# Patient Record
Sex: MALE | Race: WHITE | NOT HISPANIC OR LATINO | Employment: OTHER | URBAN - NONMETROPOLITAN AREA
[De-identification: names, ages, dates, MRNs, and addresses within clinical notes are randomized per-mention and may not be internally consistent; named-entity substitution may affect disease eponyms.]

---

## 2024-10-11 ENCOUNTER — OFFICE VISIT (OUTPATIENT)
Dept: SURGERY | Facility: CLINIC | Age: 74
End: 2024-10-11
Payer: MEDICARE

## 2024-10-11 VITALS
HEIGHT: 66 IN | HEART RATE: 74 BPM | SYSTOLIC BLOOD PRESSURE: 162 MMHG | OXYGEN SATURATION: 94 % | BODY MASS INDEX: 27.23 KG/M2 | DIASTOLIC BLOOD PRESSURE: 101 MMHG | WEIGHT: 169.4 LBS

## 2024-10-11 DIAGNOSIS — K64.4 EXTERNAL HEMORRHOID: ICD-10-CM

## 2024-10-11 DIAGNOSIS — K64.1 GRADE II HEMORRHOIDS: ICD-10-CM

## 2024-10-11 RX ORDER — GLIPIZIDE 5 MG/1
TABLET ORAL
COMMUNITY
Start: 2024-09-26

## 2024-10-11 RX ORDER — ALLOPURINOL 300 MG/1
1 TABLET ORAL DAILY
COMMUNITY
Start: 2024-09-26

## 2024-10-11 RX ORDER — METOPROLOL SUCCINATE 50 MG/1
TABLET, EXTENDED RELEASE ORAL
COMMUNITY

## 2024-10-11 RX ORDER — OMEPRAZOLE 40 MG/1
1 CAPSULE, DELAYED RELEASE ORAL DAILY
COMMUNITY
Start: 2024-09-26

## 2024-10-11 RX ORDER — LISINOPRIL 20 MG/1
1 TABLET ORAL DAILY
COMMUNITY
Start: 2024-09-26

## 2024-10-11 RX ORDER — SIMVASTATIN 40 MG
1 TABLET ORAL DAILY
COMMUNITY
Start: 2024-09-26

## 2024-10-11 RX ORDER — ALBUTEROL SULFATE 90 UG/1
INHALANT RESPIRATORY (INHALATION)
COMMUNITY

## 2024-10-11 NOTE — PROGRESS NOTES
[facility] General Surgery Clinic History and Physical  Kem Joseph  MRN: [id number]  Age: 73 y.o. male   YOB: 1950      SUBJECTIVE  History of Presenting Illness   Patient is a 73 y.o. male presenting for hemorrhoid evaluation.  Describes symptoms as issues cleaning, concerns for some incontinence, with intermittent bleeding and minimal pain, and describes bowel movements as sometimes hard sometimes soft and usually spends less than 5 minutes on the commode with intermittent straining.    Colonoscopy: Does not remember date of last colonoscopy-is due in November of this year, states that he has never had concerning findings on a colonoscopy  Family hx of colorectal cancer: None  Anticoagulation/Antiplatelet meds: None    He otherwise denies lightheadedness, dizziness, fainting, passing out, headache, nausea, vomiting, chest pain, palpitations, shortness of breath, coughing, wheezing, heart burn, reflux, skin lesions, unintended weight loss/gain, sensitivity to heat/cold, changes in sensation, changes in vision/hearing/smell/taste, myalgias, arthralgias, and has no other acute complaints or concerning symptoms to report at this time.      Past Medical History     Past Medical History:  Past Medical History:   Diagnosis Date    Hypertension        PCP: Paul Casas MD    Past Surgical History:  Past Surgical History:   Procedure Laterality Date    KNEE ARTHROSCOPY W/ LATERAL RELEASE / MEDIAL IMBRICATION      UMBILICAL HERNIA REPAIR         Family History:  Family History   Problem Relation Age of Onset    No Known Problems Mother        Social History:  Social History     Tobacco Use    Smoking status: Never     Passive exposure: Never    Smokeless tobacco: Never   Substance Use Topics    Alcohol use: Not Currently       Allergies:  No Known Allergies    Medications:  (Not in a hospital admission)        Review of Systems   Per HPI.      Physical Examination     Vitals:    10/11/24  "1038   BP: (!) 162/101   Pulse: 74   SpO2: 94%   Weight: 76.8 kg (169 lb 6.4 oz)   Height: 167.6 cm (66\")       Estimated body mass index is 27.34 kg/m² as calculated from the following:    Height as of this encounter: 167.6 cm (66\").    Weight as of this encounter: 76.8 kg (169 lb 6.4 oz).  PREVIOUS WEIGHTS:   Wt Readings from Last 5 Encounters:   10/11/24 76.8 kg (169 lb 6.4 oz)       Physical Examination:  Gen: awake, alert, sitting up in chair in no acute distress  HEENT: NCAT, EOMI, anicteric sclerae  CV: RRR, normotensive  Resp: nonlabored on room air, sats appropriate  Abd: soft, nontender, nondistended without palpable mass; well-healed umbilical hernia repair scars  Rectal:   External: Moderately enlarged right external hemorrhoid, small left-sided external hemorrhoid; both are nontender without sequela of bleeding, neither has evidence of thrombosis   ELTON: Large hemorrhoid columns, no stool or blood in the rectal vault   *Please refer to anoscopy report for further details.*  MSK: moves all four, no c/c/e  Neuro: no focal deficits, cranial nerves grossly intact  Psy:  appropriate, cooperative      Data Review   No labs or imaging to review at this time.      Problem List   There is no problem list on file for this patient.           Assessment/Plan   Kem Joseph is a 73 y.o. male with both internal and external hemorrhoids.  We discussed that the internal hemorrhoids are likely causing his bleeding, while the large right sided external hemorrhoid may be causing issues with his ability to keep clean.  I discussed the plan to treat his internal hemorrhoidal disease with rubber band ligation in hopes that this may decrease the vascular burden to the external hemorrhoids and, along with conservative measures, assist and a reduction in size.  Once we are finished with those treatments, we will check his progress, and see if he would like to proceed with excisional hemorrhoidectomy for his external hemorrhoids " at that time.    We discussed the nature of hemorrhoids as physiologic rather than pathologic, the role of conservative management, and options for intervention including rubber band ligation and surgical hemorrhoidectomy.  We discussed that hemorrhoidectomy is reserved for refractory external hemorrhoids and internal hemorrhoids that do not respond to conservative management or banding. I have recommended treatment of internal hemorrhoids and have treated with rubber band ligation of the left lateral column.  He was again counseled as to appropriate fiber and water intake, appropriate toileting habits, and he was provided information packet with this information prior to his discharge from clinic.  He was counseled to call our office or return to the emergency department with any significant bleeding per rectum, or any problems following examination/treatment today.  Ample time was provided for questions all of which were answered to the patient's apparent satisfaction.     Smoking cessation counseling: Never-not indicated  BMI counselin.3-not indicated  Med rec complete: Yes  VTE: Not indicated    Time Spent: I spent 30 minutes caring for Kem Joseph on this date of service. This time includes time, independent of procedures, spent by me in the following activities: preparing for the clinic visit, reviewing tests, obtaining and/or reviewing a separately obtained history, performing a medically appropriate examination and/or evaluation, counseling and educating the patient/family/caregiver, ordering medications, tests, or procedures, and documenting information in the medical record.     Kt Erazo MD  10/11/2024 11:22 CDT

## 2024-10-11 NOTE — PATIENT INSTRUCTIONS
General Surgery Clinic Discharge Instructions      Kem Joseph  10/11/24      General Surgery Clinic Discharge Instructions: RUBBER BAND LIGATION OF HEMORRHOIDS    This method involves the application of small rubber bands to the hemorrhoids. The bands, being elastic, tighten down and destroy the hemorrhoids trapped inside the band, just as if they were cut out with a scalpel.  Only internal hemorrhoids are treated this way. These hemorrhoids do not have “pain” fibers. For this reason, we do not have to use any anesthetic. You will feel and hear a snap or thump as the bands are applied. About 25% of patients experience mild, dull anal discomfort lasting for 2-3 days following the procedure. You may feel a dull ache, not severe pain, for a few hours. Many like to describe the feeling of discomfort thus: “I feel as if I want to have a bowel movement” or “there seems to be stool in my rectum near the opening”. This feeling will occur as soon as the rubber bands are applied. Sitting in a hot tub will usually relieve this sensation.  In 24-48 hours, the hemorrhoid caught in the rubber band will be completely destroyed. The destroyed hemorrhoid will drop off in approximately 7-14 days. You will usually not notice this, although some patients may notice spotting at that time. By avoiding constipation, straining or loose stools, you should experience no trouble with marked bleeding.      AFTER TREATMENT  Activity  You may go ahead with your usual activity.  You may drive your car immediately after the treatment.  AVOID heavy lifting, if possible (for the first 24 hours after your procedure.)    Diet  You can eat what you like, but follow fiber recommendations per the information sheet we provided to you today  Drink a MINIMUM of 10 glasses of fluid daily.    Medications  Take your medications as prescribed.  If you usually take Aspirin 81mg or 325mg per day you may continue to take this,  otherwise NO aspirin for 14  days.  TYLENOL (not aspirin) if needed, may be taken for pain.  Take warm sitz baths 3-4 times daily, especially after each bowel movement, 15 minutes at a time. The water temperature should be as hot as you can tolerate. Check the water temperature with your elbow. You will find the sitz bath soothing and relaxing.    Bowel Movements  DO NOT STRAIN to have a bowel movement.  DO NOT SIT on the toilet for more than 5 minutes.  NEVER read while sitting on the toilet! Take the Storee OUT of the bathroom    Problems  Some spotting or bleeding is to be expected even up to 14 days after the procedure.  Report to the ER immediately if there is profuse bleeding.  About 2% of the patients treated with Rubber Band Ligation develop thrombosis of an external hemorrhoid which may require excision.    Hemorrhoids may reoccur. The best preventive measures are proper diet and toilet habits.    REMEMBER: RECTAL BLEEDING, WHICH MAY APPEAR IN THE FUTURE, SHOULD NOT BE DISREGARDED. OTHER CAUSES OF BLEEDING SUCH AS FISSURES, POLYPS OR CANCER CAN APPEAR AND SHOULD BE INVESTIGATED.    Diagnosis: internal and external hemorrhoids    Medications/Treatments:   (Take all medications as prescribed by your provider)  Fiber supplementation - like metamucil - daily  Continue appropriate toileting habits  Daily or twice-daily sitz baths      Follow-up: Return to clinic in 4 weeks for further hemorrhoid treatment.       Please call our office at [phone] with any issues, questions, or concerns.        Kt Erazo MD  10/11/24

## 2024-10-11 NOTE — LETTER
October 11, 2024       No Recipients    Patient: Kem Joseph   YOB: 1950   Date of Visit: 10/11/2024     Dear Paul Casas MD:       I have seen eKm Joseph in my clinic today in evaluation of his anorectal complaints.  On my exam, he has both symptomatic internal and external hemorrhoids.  I discussed with him that we will attempt to treat his internal hemorrhoidal disease first, in an attempt to decrease the vascular burden on the external hemorrhoids; in addition we have talked about lifestyle modifications, including increased fiber and water intake, appropriate toileting habits, and daily sitz bath, in an effort to improve symptoms conservatively as well.  I will see him back over the next couple months to continue treating his internal hemorrhoidal disease, after which we will assess the progress on his external disease, and make a decision about excisional hemorrhoidectomy at that time.    If you have any questions or concerns about his care, please feel free to reach out.  Thank you very much for this referral, and for involving me in Kem's care.         Sincerely,        Kt Erazo MD        CC:   No Recipients    Kt Erazo MD  10/11/24 1118  Sign when Signing Visit    General Surgery Rubber Band Ligation and Anoscopy Procedure Note    Indication: Bleeding internal hemorrhoids    Procedure: The patient was placed in the left lateral decubitus position.  The anoscope was gently inserted and the bowel was inspected.  Visualization was good.  Mucosa was generally pink and healthy appearing.  Anoscopy revealed enlarged columns at all 3 locations, with the left lateral column being the largest, with some sequela of recent bleeding. Decision was made to place a rubber band on the LL hemorrhoid column, which appeared to be the most enlarged and inflamed, with some sequelae of recent bleeding. The pedicle of that hemorrhoid was grasped and retracted into the McGiveny  ligator, and two bands were applied in the usual fashion. The patient tolerated this well, without significant discomfort or significant bleeding.     Information sheets, follow-up appointments, and return precautions were given to the patient prior to discharge from clinic.       Kt Erazo MD  10/11/24      Kt Erazo MD  10/11/24 1122  Sign when Signing Visit    [facility] Surgery Clinic History and Physical  Kem Joseph  MRN: [id number]  Age: 73 y.o. male   YOB: 1950      SUBJECTIVE  History of Presenting Illness   Patient is a 73 y.o. male presenting for hemorrhoid evaluation.  Describes symptoms as issues cleaning, concerns for some incontinence, with intermittent bleeding and minimal pain, and describes bowel movements as sometimes hard sometimes soft and usually spends less than 5 minutes on the commode with intermittent straining.    Colonoscopy: Does not remember date of last colonoscopy-is due in November of this year, states that he has never had concerning findings on a colonoscopy  Family hx of colorectal cancer: None  Anticoagulation/Antiplatelet meds: None    He otherwise denies lightheadedness, dizziness, fainting, passing out, headache, nausea, vomiting, chest pain, palpitations, shortness of breath, coughing, wheezing, heart burn, reflux, skin lesions, unintended weight loss/gain, sensitivity to heat/cold, changes in sensation, changes in vision/hearing/smell/taste, myalgias, arthralgias, and has no other acute complaints or concerning symptoms to report at this time.      Past Medical History     Past Medical History:  Past Medical History:   Diagnosis Date   • Hypertension        PCP: Paul Casas MD    Past Surgical History:  Past Surgical History:   Procedure Laterality Date   • KNEE ARTHROSCOPY W/ LATERAL RELEASE / MEDIAL IMBRICATION     • UMBILICAL HERNIA REPAIR         Family History:  Family History   Problem Relation Age of Onset  "  • No Known Problems Mother        Social History:  Social History     Tobacco Use   • Smoking status: Never     Passive exposure: Never   • Smokeless tobacco: Never   Substance Use Topics   • Alcohol use: Not Currently       Allergies:  No Known Allergies    Medications:  (Not in a hospital admission)        Review of Systems   Per HPI.      Physical Examination     Vitals:    10/11/24 1038   BP: (!) 162/101   Pulse: 74   SpO2: 94%   Weight: 76.8 kg (169 lb 6.4 oz)   Height: 167.6 cm (66\")       Estimated body mass index is 27.34 kg/m² as calculated from the following:    Height as of this encounter: 167.6 cm (66\").    Weight as of this encounter: 76.8 kg (169 lb 6.4 oz).  PREVIOUS WEIGHTS:   Wt Readings from Last 5 Encounters:   10/11/24 76.8 kg (169 lb 6.4 oz)       Physical Examination:  Gen: awake, alert, sitting up in chair in no acute distress  HEENT: NCAT, EOMI, anicteric sclerae  CV: RRR, normotensive  Resp: nonlabored on room air, sats appropriate  Abd: soft, nontender, nondistended without palpable mass; well-healed umbilical hernia repair scars  Rectal:   External: Moderately enlarged right external hemorrhoid, small left-sided external hemorrhoid; both are nontender without sequela of bleeding, neither has evidence of thrombosis   ELTON: Large hemorrhoid columns, no stool or blood in the rectal vault   *Please refer to anoscopy report for further details.*  MSK: moves all four, no c/c/e  Neuro: no focal deficits, cranial nerves grossly intact  Psy:  appropriate, cooperative      Data Review   No labs or imaging to review at this time.      Problem List   There is no problem list on file for this patient.           Assessment/Plan   Kem Joseph is a 73 y.o. male with both internal and external hemorrhoids.  We discussed that the internal hemorrhoids are likely causing his bleeding, while the large right sided external hemorrhoid may be causing issues with his ability to keep clean.  I discussed the plan " to treat his internal hemorrhoidal disease with rubber band ligation in hopes that this may decrease the vascular burden to the external hemorrhoids and, along with conservative measures, assist and a reduction in size.  Once we are finished with those treatments, we will check his progress, and see if he would like to proceed with excisional hemorrhoidectomy for his external hemorrhoids at that time.    We discussed the nature of hemorrhoids as physiologic rather than pathologic, the role of conservative management, and options for intervention including rubber band ligation and surgical hemorrhoidectomy.  We discussed that hemorrhoidectomy is reserved for refractory external hemorrhoids and internal hemorrhoids that do not respond to conservative management or banding. I have recommended treatment of internal hemorrhoids and have treated with rubber band ligation of the left lateral column.  He was again counseled as to appropriate fiber and water intake, appropriate toileting habits, and he was provided information packet with this information prior to his discharge from clinic.  He was counseled to call our office or return to the emergency department with any significant bleeding per rectum, or any problems following examination/treatment today.  Ample time was provided for questions all of which were answered to the patient's apparent satisfaction.     Smoking cessation counseling: Never-not indicated  BMI counselin.3-not indicated  Med rec complete: Yes  VTE: Not indicated    Time Spent: I spent 30 minutes caring for Kem Joseph on this date of service. This time includes time, independent of procedures, spent by me in the following activities: preparing for the clinic visit, reviewing tests, obtaining and/or reviewing a separately obtained history, performing a medically appropriate examination and/or evaluation, counseling and educating the patient/family/caregiver, ordering medications, tests, or  procedures, and documenting information in the medical record.     Kt Erazo MD  10/11/2024 11:22 CDT

## 2024-10-11 NOTE — PROGRESS NOTES
General Surgery Rubber Band Ligation and Anoscopy Procedure Note    Indication: Bleeding internal hemorrhoids    Procedure: The patient was placed in the left lateral decubitus position.  The anoscope was gently inserted and the bowel was inspected.  Visualization was good.  Mucosa was generally pink and healthy appearing.  Anoscopy revealed enlarged columns at all 3 locations, with the left lateral column being the largest, with some sequela of recent bleeding. Decision was made to place a rubber band on the LL hemorrhoid column, which appeared to be the most enlarged and inflamed, with some sequelae of recent bleeding. The pedicle of that hemorrhoid was grasped and retracted into the McGiveny ligator, and two bands were applied in the usual fashion. The patient tolerated this well, without significant discomfort or significant bleeding.     Information sheets, follow-up appointments, and return precautions were given to the patient prior to discharge from clinic.       Kt Erazo MD  10/11/24

## 2024-10-31 ENCOUNTER — OFFICE VISIT (OUTPATIENT)
Dept: PULMONOLOGY | Facility: CLINIC | Age: 74
End: 2024-10-31
Payer: MEDICARE

## 2024-10-31 VITALS
SYSTOLIC BLOOD PRESSURE: 128 MMHG | DIASTOLIC BLOOD PRESSURE: 82 MMHG | BODY MASS INDEX: 27.9 KG/M2 | HEIGHT: 66 IN | OXYGEN SATURATION: 96 % | WEIGHT: 173.6 LBS | HEART RATE: 82 BPM

## 2024-10-31 DIAGNOSIS — J45.30 MILD PERSISTENT ASTHMA, UNSPECIFIED WHETHER COMPLICATED: Primary | ICD-10-CM

## 2024-10-31 DIAGNOSIS — Z23 NEED FOR PNEUMOCOCCAL 20-VALENT CONJUGATE VACCINATION: ICD-10-CM

## 2024-10-31 DIAGNOSIS — Z87.891 FORMER SMOKER: ICD-10-CM

## 2024-10-31 DIAGNOSIS — R06.09 CHRONIC DYSPNEA: ICD-10-CM

## 2024-10-31 RX ORDER — DEXTROMETHORPHAN HYDROBROMIDE AND PROMETHAZINE HYDROCHLORIDE 15; 6.25 MG/5ML; MG/5ML
SYRUP ORAL
COMMUNITY

## 2024-10-31 RX ORDER — AMOXICILLIN 500 MG/1
CAPSULE ORAL
COMMUNITY

## 2024-10-31 RX ORDER — DOXYCYCLINE 100 MG/1
CAPSULE ORAL
COMMUNITY

## 2024-10-31 RX ORDER — PREDNISONE 20 MG/1
TABLET ORAL
COMMUNITY

## 2024-10-31 RX ORDER — MECLIZINE HCL 12.5 MG 12.5 MG/1
2 TABLET ORAL 3 TIMES DAILY
COMMUNITY

## 2024-10-31 RX ORDER — LIDOCAINE 50 MG/G
PATCH TOPICAL
COMMUNITY

## 2024-10-31 RX ORDER — FLUTICASONE PROPIONATE 50 MCG
SPRAY, SUSPENSION (ML) NASAL
COMMUNITY

## 2024-10-31 RX ORDER — HYDROCODONE BITARTRATE AND HOMATROPINE METHYLBROMIDE ORAL SOLUTION 5; 1.5 MG/5ML; MG/5ML
LIQUID ORAL
COMMUNITY

## 2024-10-31 RX ORDER — CEFDINIR 300 MG/1
CAPSULE ORAL
COMMUNITY

## 2024-10-31 RX ORDER — ONDANSETRON 4 MG/1
TABLET, FILM COATED ORAL
COMMUNITY

## 2024-10-31 RX ORDER — HYDRALAZINE HYDROCHLORIDE 50 MG/1
1 TABLET, FILM COATED ORAL 2 TIMES DAILY
COMMUNITY

## 2024-10-31 RX ORDER — OXYCODONE AND ACETAMINOPHEN 7.5; 325 MG/1; MG/1
TABLET ORAL
COMMUNITY

## 2024-10-31 RX ORDER — FLUTICASONE PROPIONATE AND SALMETEROL 250; 50 UG/1; UG/1
POWDER RESPIRATORY (INHALATION)
COMMUNITY
Start: 2024-08-25

## 2024-10-31 RX ORDER — MONTELUKAST SODIUM 10 MG/1
TABLET ORAL
COMMUNITY
Start: 2024-09-26

## 2024-10-31 NOTE — LETTER
2024     Paul Casas MD   [facility] [address]    Patient: Kem Joseph   YOB: 1950   Date of Visit: 10/31/2024     Dear Dr. Augusto MD:    Thank you for referring Kem Joseph to me for evaluation. Below are the relevant portions of my assessment and plan of care.    If you have questions, please do not hesitate to call me. I look forward to following Kem along with you.         Sincerely,        Aleksander Norwood,         CC: No Recipients      Progress Notes:    Clinic Note - New Patient            NAME: Kem Joseph  MRN: [id number]  AGE: 73 y.o.            : 1950  PRIMARY CARE PROVIDER: Paul Casas MD               Reason for Visit:  Kem Joseph presents to clinic today as a new patient for the evaluation of asthma.    History of Present Illness:  Mr. Joseph is a 73-year-old male who presents to clinic today as a new patient.  Past medical history includes mild persistent asthma, former tobacco use, hypertension.    Patient recently moved back to the area from [place].  He was following with pulmonology in [place].  He has a history of asthma diagnosed around 2010.  During that time he was admitted to [facility] for acute hypoxic respiratory failure and pneumonia.  He is currently managed with Wixela and as needed albuterol.  He was recently started on Singulair by his pulmonologist in [place] in March of this year.  He states that his respiratory status is greatly improved with the Singulair.  He currently has no limitations in his activities due to dyspnea.  He is also noted improvement in his cough since starting the Singulair.  Denies any wheezing.    He has a history of asthma managed as above.  Has never required supplemental oxygen at home.  He is a former smoker who quit in , 15-pack-year history.  Denies any personal or family history of lung cancer or VTE.  He lives at home which is clean and dry.  No pets.   No seasonal allergies.    Review of Systems:  A full 12-point review of systems was performed and was negative except as documented in the HPI.               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  Pets: None  Recent travel: Recently moved back to the area from [place]  Previous exposure to persons with tuberculosis: None known            Physical Exam:  General: No acute distress, cooperative.  Head: Atraumatic, normocephalic, normal inspection.  Eyes: EOMI, normal inspection.  ENT: Mucous membranes moist, normal inspection. No thrush.  Heart: RRR, no murmur  Lungs: Clear to auscultation bilaterally, no wheezing  MSK: No edema or clubbing  GI/abdominal: Soft, nontender.  Neurologic: Alert, oriented.  Cranial nerves II through XII grossly intact.      Imaging Review:  None available for review.      Pulmonary Functions Testing Results:  None available for review.            Problem List:  Problem List Items Addressed This Visit          Pulmonary and Pneumonias    Mild persistent asthma - Primary    Relevant Medications    Wixela Inhub 250-50 MCG/ACT DISKUS    montelukast (SINGULAIR) 10 MG tablet    Other Relevant Orders    Complete PFT - Pre & Post Bronchodilator       Symptoms and Signs    Chronic dyspnea       Tobacco    Former smoker       Other    Need for pneumococcal 20-valent conjugate vaccination    Relevant Orders    Pneumococcal Conjugate Vaccine 20-Valent All (Completed)         Pulmonary Assessment:  Patient is a 73-year-old male with currently well-controlled asthma presenting for establish care.  Managed with ICS/LABA and as needed albuterol.  He is also on Singulair.  Will obtain pre and post PFTs as a baseline prior to follow-up.  Pneumonia vaccine given today, Arexvy sent to his pharmacy.  Up-to-date on flu vaccine.      Plan:   -Continue Wixela and as  needed albuterol, currently well-controlled asthma  -Continue Singulair  -Discussed if he has difficulty with worsening of his asthma symptoms we have several options to step up his treatment  -No longer requires screening CT  -PFTs pre and post prior to follow-up  -Up-to-date on flu, received pneumonia today, prescription sent for Arexvy           Follow Up:  3 months         Thank you Paul Casas MD for this referral and allowing me to participate in this patient's care.           Past Medical History:   Past Medical History:   Diagnosis Date   • Hypertension          Past Surgical History:   Past Surgical History:   Procedure Laterality Date   • KNEE ARTHROSCOPY W/ LATERAL RELEASE / MEDIAL IMBRICATION     • UMBILICAL HERNIA REPAIR           Family History:   Family History   Problem Relation Age of Onset   • No Known Problems Mother    • Colon cancer Neg Hx    • Colon polyps Neg Hx    • Esophageal cancer Neg Hx          Medications:   Prior to Admission medications    Medication Sig Start Date End Date Taking? Authorizing Provider   albuterol sulfate  (90 Base) MCG/ACT inhaler    Yes Garo Stone MD   allopurinol (ZYLOPRIM) 300 MG tablet Take 1 tablet by mouth Daily. 9/26/24  Yes Garo Stone MD   glipizide (GLUCOTROL) 5 MG tablet Take 1 tablet twice a day by oral route, for diabetes mellitus.. 9/26/24  Yes Garo Stone MD   lisinopril (PRINIVIL,ZESTRIL) 20 MG tablet Take 1 tablet by mouth Daily. 9/26/24  Yes Garo Stone MD   metFORMIN (GLUCOPHAGE) 500 MG tablet Take 1 tablet by mouth 2 (Two) Times a Day.   Yes Garo Stone MD   metoprolol succinate XL (TOPROL-XL) 50 MG 24 hr tablet    Yes Garo Stone MD   omeprazole (priLOSEC) 40 MG capsule Take 1 capsule by mouth Daily. 9/26/24  Yes Garo Stone MD   predniSONE (DELTASONE) 20 MG tablet TAKE 1 TABLET BY MOUTH EVERY DAY FOR 7 DAYS AS DIRECTED   Yes Garo Stone MD    promethazine-dextromethorphan (PROMETHAZINE-DM) 6.25-15 MG/5ML syrup TAKE 5 ML BY MOUTH EVERY 6 HOURS FOR 10 DAYS AS NEEDED   Yes Garo Stone MD   simvastatin (ZOCOR) 40 MG tablet Take 1 tablet by mouth Daily. 9/26/24  Yes Garo Stone MD Wixela Inhub 250-50 MCG/ACT DISKUS  8/25/24  Yes Garo Stone MD   amoxicillin (AMOXIL) 500 MG capsule Take 4 pills by mouth 1 hour prior to dental procedures.  Patient not taking: Reported on 10/31/2024    Garo Stone MD   cefdinir (OMNICEF) 300 MG capsule TAKE 2 CAPSULES BY MOUTH DAILY AFTER A MEAL FOR 7 DAYS  Patient not taking: Reported on 10/31/2024    Garo Stone MD   doxycycline (VIBRAMYCIN) 100 MG capsule TAKE 1 CAPSULE BY MOUTH TWICE DAILY FOR 7 DAYS AS DIRECTED  Patient not taking: Reported on 10/31/2024    Garo Stone MD   fluticasone (FLONASE) 50 MCG/ACT nasal spray INSTIL 1 SPRAY INTO EACH NOSTRIL EVERY DAY  Patient not taking: Reported on 10/31/2024    Garo Stone MD   hydrALAZINE (APRESOLINE) 50 MG tablet Take 1 tablet by mouth 2 (Two) Times a Day.  Patient not taking: Reported on 10/31/2024    Garo Stone MD   HYDROcodone Bit-Homatrop MBr (HYCODAN) 5-1.5 MG/5ML solution TAKE 5 ML BY MOUTH EVERY 4 HOURS  Patient not taking: Reported on 10/31/2024    Garo Stone MD   lidocaine (LIDODERM) 5 % APPLY 1 PATCH TOPICALLY ONCE DAILY. MAY WEAR UP TO 12 HOURS  Patient not taking: Reported on 10/31/2024    Garo Stone MD   meclizine (ANTIVERT) 12.5 MG tablet Take 2 tablets by mouth 3 (Three) Times a Day.  Patient not taking: Reported on 10/31/2024    Garo Stone MD   montelukast (SINGULAIR) 10 MG tablet T1T PO QD  Patient not taking: Reported on 10/31/2024 9/26/24   Garo Stone MD   ondansetron (ZOFRAN) 4 MG tablet     Garo Stone MD   oxyCODONE-acetaminophen (PERCOCET) 7.5-325 MG per tablet TAKE 1 TO 2 TABLETS BY MOUTH EVERY 6 HOURS AS NEEDED FOR  "POST OPERATIVE PAIN  Patient not taking: Reported on 10/31/2024    Provider, Garo, MD         Allergies:   Patient has no known allergies.      Results Review:             Visit Vitals  /82   Pulse 82   Ht 167.6 cm (66\")   Wt 78.7 kg (173 lb 9.6 oz)   SpO2 96% Comment: RA   BMI 28.02 kg/m²                  Sam Norwood DO  Pulmonary/Critical Care  10/31/2024  10:48 CDT  "

## 2024-10-31 NOTE — PROGRESS NOTES
Clinic Note - New Patient            NAME: Kem Joseph  MRN: [id number]  AGE: 73 y.o.            : 1950  PRIMARY CARE PROVIDER: Paul Casas MD               Reason for Visit:  Kem Joseph presents to clinic today as a new patient for the evaluation of asthma.    History of Present Illness:  Mr. Joseph is a 73-year-old male who presents to clinic today as a new patient.  Past medical history includes mild persistent asthma, former tobacco use, hypertension.    Patient recently moved back to the area from [place].  He was following with pulmonology in [place].  He has a history of asthma diagnosed around 2010.  During that time he was admitted to [facility] for acute hypoxic respiratory failure and pneumonia.  He is currently managed with Wixela and as needed albuterol.  He was recently started on Singulair by his pulmonologist in [place] in March of this year.  He states that his respiratory status is greatly improved with the Singulair.  He currently has no limitations in his activities due to dyspnea.  He is also noted improvement in his cough since starting the Singulair.  Denies any wheezing.    He has a history of asthma managed as above.  Has never required supplemental oxygen at home.  He is a former smoker who quit in , 15-pack-year history.  Denies any personal or family history of lung cancer or VTE.  He lives at home which is clean and dry.  No pets.  No seasonal allergies.    Review of Systems:  A full 12-point review of systems was performed and was negative except as documented in the HPI.               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  Pets: None  Recent travel: Recently moved back to the area from [place]  Previous exposure to persons with tuberculosis: None known            Physical Exam:  General: No acute distress,  cooperative.  Head: Atraumatic, normocephalic, normal inspection.  Eyes: EOMI, normal inspection.  ENT: Mucous membranes moist, normal inspection. No thrush.  Heart: RRR, no murmur  Lungs: Clear to auscultation bilaterally, no wheezing  MSK: No edema or clubbing  GI/abdominal: Soft, nontender.  Neurologic: Alert, oriented.  Cranial nerves II through XII grossly intact.      Imaging Review:  None available for review.      Pulmonary Functions Testing Results:  None available for review.            Problem List:  Problem List Items Addressed This Visit          Pulmonary and Pneumonias    Mild persistent asthma - Primary    Relevant Medications    Wixela Inhub 250-50 MCG/ACT DISKUS    montelukast (SINGULAIR) 10 MG tablet    Other Relevant Orders    Complete PFT - Pre & Post Bronchodilator       Symptoms and Signs    Chronic dyspnea       Tobacco    Former smoker       Other    Need for pneumococcal 20-valent conjugate vaccination    Relevant Orders    Pneumococcal Conjugate Vaccine 20-Valent All (Completed)         Pulmonary Assessment:  Patient is a 73-year-old male with currently well-controlled asthma presenting for establish care.  Managed with ICS/LABA and as needed albuterol.  He is also on Singulair.  Will obtain pre and post PFTs as a baseline prior to follow-up.  Pneumonia vaccine given today, Arexvy sent to his pharmacy.  Up-to-date on flu vaccine.      Plan:   -Continue Wixela and as needed albuterol, currently well-controlled asthma  -Continue Singulair  -Discussed if he has difficulty with worsening of his asthma symptoms we have several options to step up his treatment  -No longer requires screening CT  -PFTs pre and post prior to follow-up  -Up-to-date on flu, received pneumonia today, prescription sent for Arexvy           Follow Up:  3 months         Thank you Paul Casas MD for this referral and allowing me to participate in this patient's care.           Past Medical History:   Past Medical  History:   Diagnosis Date    Hypertension          Past Surgical History:   Past Surgical History:   Procedure Laterality Date    KNEE ARTHROSCOPY W/ LATERAL RELEASE / MEDIAL IMBRICATION      UMBILICAL HERNIA REPAIR           Family History:   Family History   Problem Relation Age of Onset    No Known Problems Mother     Colon cancer Neg Hx     Colon polyps Neg Hx     Esophageal cancer Neg Hx          Medications:   Prior to Admission medications    Medication Sig Start Date End Date Taking? Authorizing Provider   albuterol sulfate  (90 Base) MCG/ACT inhaler    Yes Garo Stone MD   allopurinol (ZYLOPRIM) 300 MG tablet Take 1 tablet by mouth Daily. 9/26/24  Yes Garo Stone MD   glipizide (GLUCOTROL) 5 MG tablet Take 1 tablet twice a day by oral route, for diabetes mellitus.. 9/26/24  Yes Garo Stone MD   lisinopril (PRINIVIL,ZESTRIL) 20 MG tablet Take 1 tablet by mouth Daily. 9/26/24  Yes Garo Stone MD   metFORMIN (GLUCOPHAGE) 500 MG tablet Take 1 tablet by mouth 2 (Two) Times a Day.   Yes Garo Stone MD   metoprolol succinate XL (TOPROL-XL) 50 MG 24 hr tablet    Yes Garo Stone MD   omeprazole (priLOSEC) 40 MG capsule Take 1 capsule by mouth Daily. 9/26/24  Yes Garo Stone MD   predniSONE (DELTASONE) 20 MG tablet TAKE 1 TABLET BY MOUTH EVERY DAY FOR 7 DAYS AS DIRECTED   Yes Garo Stone MD   promethazine-dextromethorphan (PROMETHAZINE-DM) 6.25-15 MG/5ML syrup TAKE 5 ML BY MOUTH EVERY 6 HOURS FOR 10 DAYS AS NEEDED   Yes Garo Stone MD   simvastatin (ZOCOR) 40 MG tablet Take 1 tablet by mouth Daily. 9/26/24  Yes Garo Stone MD   Wixela Inhub 250-50 MCG/ACT DISKUS  8/25/24  Yes Garo Stone MD   amoxicillin (AMOXIL) 500 MG capsule Take 4 pills by mouth 1 hour prior to dental procedures.  Patient not taking: Reported on 10/31/2024    Garo Stone MD   cefdinir (OMNICEF) 300 MG capsule TAKE 2  "CAPSULES BY MOUTH DAILY AFTER A MEAL FOR 7 DAYS  Patient not taking: Reported on 10/31/2024    Garo Stone MD   doxycycline (VIBRAMYCIN) 100 MG capsule TAKE 1 CAPSULE BY MOUTH TWICE DAILY FOR 7 DAYS AS DIRECTED  Patient not taking: Reported on 10/31/2024    Garo Stone MD   fluticasone (FLONASE) 50 MCG/ACT nasal spray INSTIL 1 SPRAY INTO EACH NOSTRIL EVERY DAY  Patient not taking: Reported on 10/31/2024    Garo Stone MD   hydrALAZINE (APRESOLINE) 50 MG tablet Take 1 tablet by mouth 2 (Two) Times a Day.  Patient not taking: Reported on 10/31/2024    Garo Stone MD   HYDROcodone Bit-Homatrop MBr (HYCODAN) 5-1.5 MG/5ML solution TAKE 5 ML BY MOUTH EVERY 4 HOURS  Patient not taking: Reported on 10/31/2024    Garo Stone MD   lidocaine (LIDODERM) 5 % APPLY 1 PATCH TOPICALLY ONCE DAILY. MAY WEAR UP TO 12 HOURS  Patient not taking: Reported on 10/31/2024    Garo Stone MD   meclizine (ANTIVERT) 12.5 MG tablet Take 2 tablets by mouth 3 (Three) Times a Day.  Patient not taking: Reported on 10/31/2024    Garo Stone MD   montelukast (SINGULAIR) 10 MG tablet T1T PO QD  Patient not taking: Reported on 10/31/2024 9/26/24   Garo Stone MD   ondansetron (ZOFRAN) 4 MG tablet     Garo Stone MD   oxyCODONE-acetaminophen (PERCOCET) 7.5-325 MG per tablet TAKE 1 TO 2 TABLETS BY MOUTH EVERY 6 HOURS AS NEEDED FOR POST OPERATIVE PAIN  Patient not taking: Reported on 10/31/2024    Garo Stone MD         Allergies:   Patient has no known allergies.      Results Review:             Visit Vitals  /82   Pulse 82   Ht 167.6 cm (66\")   Wt 78.7 kg (173 lb 9.6 oz)   SpO2 96% Comment: RA   BMI 28.02 kg/m²                  Sam Norwood DO  Pulmonary/Critical Care  10/31/2024  10:48 CDT  "

## 2024-11-12 ENCOUNTER — OFFICE VISIT (OUTPATIENT)
Dept: SURGERY | Facility: CLINIC | Age: 74
End: 2024-11-12
Payer: MEDICARE

## 2024-11-12 VITALS
DIASTOLIC BLOOD PRESSURE: 102 MMHG | SYSTOLIC BLOOD PRESSURE: 165 MMHG | WEIGHT: 173 LBS | BODY MASS INDEX: 27.8 KG/M2 | HEIGHT: 66 IN

## 2024-11-12 DIAGNOSIS — K64.1 GRADE II HEMORRHOIDS: Primary | ICD-10-CM

## 2024-11-12 NOTE — PROGRESS NOTES
General Surgery Rubber Band Ligation and Anoscopy Procedure Note    Indication: Bleeding internal hemorrhoids    Procedure: The patient was placed in the left lateral decubitus position.  The anoscope was gently inserted and the bowel was inspected.  Visualization was good.  Mucosa was generally pink and healthy appearing. Anoscopy revealed enlarged columns at all 3 locations, the left lateral column is improved in size after banding at the last visit, but remains somewhat enlarged. Decision was made to place a rubber band on the right posterior hemorrhoid column, which appeared to be the most enlarged and inflamed at this time, with no sequelae of recent bleeding. The pedicle of that hemorrhoid was grasped and retracted into the McGiveny ligator, and two bands were applied in the usual fashion. The patient tolerated this well, without significant discomfort or significant bleeding.     Information sheets, follow-up appointments, and return precautions were given to the patient prior to discharge from clinic.       Kt Erazo MD  11/12/2024   11:38 CST

## 2024-11-12 NOTE — PATIENT INSTRUCTIONS
General Surgery Clinic Discharge Instructions: RUBBER BAND LIGATION OF HEMORRHOIDS    This method involves the application of small rubber bands to the hemorrhoids. The bands, being elastic, tighten down and destroy the hemorrhoids trapped inside the band, just as if they were cut out with a scalpel.  Only internal hemorrhoids are treated this way. These hemorrhoids do not have “pain” fibers. For this reason, we do not have to use any anesthetic. You will feel and hear a snap or thump as the bands are applied. About 25% of patients experience mild, dull anal discomfort lasting for 2-3 days following the procedure. You may feel a dull ache, not severe pain, for a few hours. Many like to describe the feeling of discomfort thus: “I feel as if I want to have a bowel movement” or “there seems to be stool in my rectum near the opening”. This feeling will occur as soon as the rubber bands are applied. Sitting in a hot tub will usually relieve this sensation.  In 24-48 hours, the hemorrhoid caught in the rubber band will be completely destroyed. The destroyed hemorrhoid will drop off in approximately 7-14 days. You will usually not notice this, although some patients may notice spotting at that time. By avoiding constipation, straining or loose stools, you should experience no trouble with marked bleeding.      AFTER TREATMENT  Activity  You may go ahead with your usual activity.  You may drive your car immediately after the treatment.  AVOID heavy lifting, if possible (for the first 24 hours after your procedure.)    Diet  You can eat what you like, but follow fiber recommendations per the information sheet we provided to you today  Drink a MINIMUM of 10 glasses of fluid daily.    Medications  Take your medications as prescribed.  If you usually take Aspirin 81mg or 325mg per day you may continue to take this,  otherwise NO aspirin for 14 days.  TYLENOL (not aspirin) if needed, may be taken for pain.  Take warm sitz  baths 3-4 times daily, especially after each bowel movement, 15 minutes at a time. The water temperature should be as hot as you can tolerate. Check the water temperature with your elbow. You will find the sitz bath soothing and relaxing.    Bowel Movements  DO NOT STRAIN to have a bowel movement.  DO NOT SIT on the toilet for more than 5 minutes.  NEVER read while sitting on the toilet! Take the library & [employer] OUT of the bathroom    Problems  Some spotting or bleeding is to be expected even up to 14 days after the procedure.  Report to the ER immediately if there is profuse bleeding.  About 2% of the patients treated with Rubber Band Ligation develop thrombosis of an external hemorrhoid which may require excision.    Hemorrhoids may reoccur. The best preventive measures are proper diet and toilet habits.    REMEMBER: RECTAL BLEEDING, WHICH MAY APPEAR IN THE FUTURE, SHOULD NOT BE DISREGARDED. OTHER CAUSES OF BLEEDING SUCH AS FISSURES, POLYPS OR CANCER CAN APPEAR AND SHOULD BE INVESTIGATED.

## 2024-11-12 NOTE — PROGRESS NOTES
"  [facility] History and Physical  Kem Joseph  MRN: [id number]  Age: 73 y.o. male   YOB: 1950      SUBJECTIVE  History of Presenting Illness   Patient is a 73 y.o. male dents in follow-up for hemorrhoid evaluation.  He initially presented on 10/11/2024 and described symptoms as issues with cleaning, concerns for some mild incontinence, with intermittent bleeding and some minimal pain, describes bowel movements as sometimes hard sometimes soft and sometimes loose, and usually spends less than 5 minutes on the commode with intermittent straining.  Since he was here last, reports that he is taking a fiber supplement, though his wife directed him not to take it on a daily basis, and he is doing somewhat better with out straining on the commode, and is taking sitz baths when he feels symptomatic.  States that after prior banding, he felt much better for approximately 2 weeks, and had no issues with cleaning or bleeding, but then symptoms have slowly progressed back to where he feels they are the same prior to banding.    Colonoscopy: Does not remember date of last colonoscopy-is due in November of this year, states that he has never had concerning findings on a colonoscopy  Family hx of colorectal cancer: None  Anticoagulation/Antiplatelet meds: None    Review of Systems   He denies lightheadedness, dizziness, fainting, passing out, headache, nausea, vomiting, chest pain, palpitations, shortness of breath, coughing, wheezing, heart burn, reflux, skin lesions, unintended weight loss/gain, sensitivity to heat/cold, changes in sensation, changes in vision/hearing/smell/taste, myalgias, arthralgias, and has no other acute complaints or concerning symptoms to report at this time.      Physical Examination     Vitals:    11/12/24 1104   BP: (!) 165/102   BP Location: Left arm   Patient Position: Sitting   Cuff Size: Adult   Weight: 78.5 kg (173 lb)   Height: 167.6 cm (65.98\") " "      Estimated body mass index is 27.94 kg/m² as calculated from the following:    Height as of this encounter: 167.6 cm (65.98\").    Weight as of this encounter: 78.5 kg (173 lb).  PREVIOUS WEIGHTS:   Wt Readings from Last 5 Encounters:   11/12/24 78.5 kg (173 lb)   10/31/24 78.7 kg (173 lb 9.6 oz)   10/11/24 76.8 kg (169 lb 6.4 oz)       Physical Examination:  Gen: awake, alert, sitting up in chair in no acute distress  HEENT: NCAT, EOMI, anicteric sclerae  CV: RRR, normotensive  Resp: nonlabored on room air, sats appropriate  Abd: soft, nontender, nondistended without palpable mass; well-healed umbilical hernia repair scars  Rectal:              External: Moderately enlarged right external hemorrhoid, small left-sided external hemorrhoid; both are nontender without sequela of bleeding, neither has evidence of thrombosis              ELTON: Large hemorrhoid columns, no stool or blood in the rectal vault              *Please refer to anoscopy report for further details.*  MSK: moves all four, no c/c/e  Neuro: no focal deficits, cranial nerves grossly intact  Psy:  appropriate, cooperative      Data Review   No recent labs or imaging to review    Problem List     Patient Active Problem List   Diagnosis    Mild persistent asthma    Need for pneumococcal 20-valent conjugate vaccination    Former smoker    Chronic dyspnea    Grade II hemorrhoids            Assessment/Plan   Kem Joseph is a 73 y.o. male with bleeding internal hemorrhoids, and issues with hygiene/cleaning.  We again discussed the plan to treat his internal hemorrhoidal disease with rubber band ligation in hopes that this may decrease the vascular burden to the external hemorrhoids and along with conservative measures assist to reduction in size.  Once we are finished with these treatments, we will check his progress and see if he would like to proceed with excisional hemorrhoidectomy for the external hemorrhoids at that time, though I discussed with " him that if his incontinence continues to be an issue, I would likely not proceed with excision-as the risk for fecal incontinence is much greater following excisional hemorrhoidectomy.    We again discussed the nature of hemorrhoids as physiologic rather than pathologic, the role of conservative management, options for intervention including RBL and surgical hemorrhoidectomy.  We discussed that hemorrhoidectomy is reserved for refractory external hemorrhoids, and internal hemorrhoids that do not reduce responded to conservative management or banding.  I have recommended the treatment of internal hemorrhoids to start, and have treated first with rubber band ligation of the left lateral column at his prior visit, and then with the treatment of the right posterior column today.  He was again counseled as to appropriate fiber and water intake and will begin fiber intake on a daily basis, appropriate toileting habits and he was provided an information packet with this information prior to his discharge from clinic.  He was counseled to call our office or return to the emergency department with any significant bleeding per rectum or any problems following exam/treatment today.  Ample time was provided for questions, all of which were answered to the patient's apparent satisfaction.      Smoking cessation: Never smoker-not indicated  BMI is >= 25 and <30. (Overweight) The following options were offered after discussion;: exercise counseling/recommendations and nutrition counseling/recommendations  Med rec complete: yes  VTE: VTE PPX is not indicated.    Time Spent: I spent 10 minutes caring for Kem Joseph on this date of service. This time includes time, independent of any procedures, spent by me in the following activities: preparing for the clinic visit, reviewing tests, obtaining and/or reviewing a separately obtained history, performing a medically appropriate examination and/or evaluation, counseling and educating  the patient/family/caregiver, ordering medications, tests, or procedures, and documenting information in the medical record.     Kt Erazo MD  11/12/2024   12:00 CST

## 2024-11-14 ENCOUNTER — HOSPITAL ENCOUNTER (OUTPATIENT)
Dept: PULMONOLOGY | Facility: HOSPITAL | Age: 74
Discharge: HOME | End: 2024-11-14
Admitting: INTERNAL MEDICINE
Payer: MEDICARE

## 2024-11-14 ENCOUNTER — OFFICE VISIT (OUTPATIENT)
Dept: GASTROENTEROLOGY | Facility: CLINIC | Age: 74
End: 2024-11-14
Payer: MEDICARE

## 2024-11-14 ENCOUNTER — TELEPHONE (OUTPATIENT)
Dept: GASTROENTEROLOGY | Facility: CLINIC | Age: 74
End: 2024-11-14

## 2024-11-14 VITALS
SYSTOLIC BLOOD PRESSURE: 150 MMHG | WEIGHT: 172 LBS | OXYGEN SATURATION: 96 % | BODY MASS INDEX: 27.64 KG/M2 | DIASTOLIC BLOOD PRESSURE: 90 MMHG | HEART RATE: 74 BPM | TEMPERATURE: 97 F | HEIGHT: 66 IN

## 2024-11-14 DIAGNOSIS — J45.30 MILD PERSISTENT ASTHMA, UNSPECIFIED WHETHER COMPLICATED: ICD-10-CM

## 2024-11-14 DIAGNOSIS — Z12.11 ENCOUNTER FOR SCREENING FOR MALIGNANT NEOPLASM OF COLON: Primary | ICD-10-CM

## 2024-11-14 PROCEDURE — 94729 DIFFUSING CAPACITY: CPT

## 2024-11-14 PROCEDURE — 94060 EVALUATION OF WHEEZING: CPT

## 2024-11-14 PROCEDURE — 94726 PLETHYSMOGRAPHY LUNG VOLUMES: CPT

## 2024-11-14 RX ORDER — ALBUTEROL SULFATE 0.83 MG/ML
2.5 SOLUTION RESPIRATORY (INHALATION) ONCE
Status: COMPLETED | OUTPATIENT
Start: 2024-11-14 | End: 2024-11-14

## 2024-11-14 RX ADMIN — ALBUTEROL SULFATE 2.5 MG: 2.5 SOLUTION RESPIRATORY (INHALATION) at 11:23

## 2024-11-14 NOTE — PROGRESS NOTES
Chief Complaint   Patient presents with    Colonoscopy     Had last colon 5 years ago in [place] time for colon has had colon by dr. bond       PCP: Paul Casas MD  REFER: Paul Casas MD    Subjective     HPI    Kem Joseph referred to office for evaluation of hemorrhoids 9/2024 by PCP.  Kem Joseph had banding of hemorrhoid by general surgery 11/12/24 and has appointment for removal of other hemorrhoids.  No trouble with rectal bleeding, reports hemorrhoids were resulting in fecal seapage.    Currently denies abdominal pain.  No changes in bowel habit.  No signs of GI blood loss. No weight loss.  Colonoscopy 5 years ago by Dr Alejandra in [place].  Kem Joseph is not aware of polyps, no family history of colon polyps or colon cancer.        Past Medical History:   Diagnosis Date    Hypertension        Past Surgical History:   Procedure Laterality Date    KNEE ARTHROSCOPY W/ LATERAL RELEASE / MEDIAL IMBRICATION      UMBILICAL HERNIA REPAIR         Outpatient Medications Marked as Taking for the 11/14/24 encounter (Office Visit) with Oj Monge APRN   Medication Sig Dispense Refill    albuterol sulfate  (90 Base) MCG/ACT inhaler       allopurinol (ZYLOPRIM) 300 MG tablet Take 1 tablet by mouth Daily.      fluticasone (FLONASE) 50 MCG/ACT nasal spray       glipizide (GLUCOTROL) 5 MG tablet Take 1 tablet twice a day by oral route, for diabetes mellitus..      hydrALAZINE (APRESOLINE) 50 MG tablet Take 1 tablet by mouth 2 (Two) Times a Day.      lisinopril (PRINIVIL,ZESTRIL) 20 MG tablet Take 1 tablet by mouth Daily.      meclizine (ANTIVERT) 12.5 MG tablet Take 2 tablets by mouth 3 (Three) Times a Day.      metoprolol succinate XL (TOPROL-XL) 50 MG 24 hr tablet       montelukast (SINGULAIR) 10 MG tablet       omeprazole (priLOSEC) 40 MG capsule Take 1 capsule by mouth Daily.      ondansetron (ZOFRAN) 4 MG tablet       predniSONE (DELTASONE) 20 MG tablet TAKE 1 TABLET BY MOUTH  "EVERY DAY FOR 7 DAYS AS DIRECTED      promethazine-dextromethorphan (PROMETHAZINE-DM) 6.25-15 MG/5ML syrup TAKE 5 ML BY MOUTH EVERY 6 HOURS FOR 10 DAYS AS NEEDED      simvastatin (ZOCOR) 40 MG tablet Take 1 tablet by mouth Daily.      Wixela Inhub 250-50 MCG/ACT DISKUS          No Known Allergies    Social History     Socioeconomic History    Marital status:    Tobacco Use    Smoking status: Former     Current packs/day: 1.00     Average packs/day: 1 pack/day for 54.9 years (54.9 ttl pk-yrs)     Types: Cigarettes     Start date: 1970     Passive exposure: Never    Smokeless tobacco: Never   Vaping Use    Vaping status: Never Used   Substance and Sexual Activity    Alcohol use: Yes     Comment: wine daily    Drug use: Never    Sexual activity: Defer       Review of Systems   Constitutional:  Negative for fever and unexpected weight change.   HENT:  Negative for trouble swallowing.    Respiratory:  Negative for shortness of breath.    Cardiovascular:  Negative for chest pain.   Gastrointestinal:  Negative for abdominal pain and anal bleeding.       Objective     Vitals:    11/14/24 1012   BP: 150/90   Pulse: 74   Temp: 97 °F (36.1 °C)   SpO2: 96%   Weight: 78 kg (172 lb)   Height: 167.6 cm (66\")     Body mass index is 27.76 kg/m².    Physical Exam  Constitutional:       Appearance: Normal appearance. He is well-developed.   Eyes:      General: No scleral icterus.  Cardiovascular:      Heart sounds: Normal heart sounds. No murmur heard.  Pulmonary:      Effort: Pulmonary effort is normal.   Abdominal:      General: Bowel sounds are normal. There is no distension.      Palpations: Abdomen is soft.      Tenderness: There is no abdominal tenderness. There is no guarding.   Skin:     General: Skin is warm and dry.      Coloration: Skin is not jaundiced.   Neurological:      Mental Status: He is alert.   Psychiatric:         Behavior: Behavior is cooperative.         Imaging Results (Most Recent)       None      "       Body mass index is 27.76 kg/m².    Assessment & Plan     Diagnoses and all orders for this visit:    1. Encounter for screening for malignant neoplasm of colon (Primary)         * Surgery not found *    Discussed scheduling for colonoscopy vs waiting for review of previous colonoscopy prior to scheduling procedure.  Kem Joseph wished to wait for review of last colonoscopy.   Our office will request records and call Kem Joseph with recommendations.           Oj Monge, APRN  11/14/24          There are no Patient Instructions on file for this visit.

## 2024-11-18 ENCOUNTER — TELEPHONE (OUTPATIENT)
Dept: PULMONOLOGY | Facility: CLINIC | Age: 74
End: 2024-11-18
Payer: MEDICARE

## 2024-11-18 NOTE — TELEPHONE ENCOUNTER
----- Message from Sam Norwood sent at 11/18/2024 12:31 PM CST -----  Please let the pt know his PFTs were normal and we will continue his current inhaled therapy.  Thanks

## 2024-11-19 ENCOUNTER — TELEPHONE (OUTPATIENT)
Dept: PULMONOLOGY | Facility: CLINIC | Age: 74
End: 2024-11-19
Payer: MEDICARE

## 2024-12-11 ENCOUNTER — TELEPHONE (OUTPATIENT)
Dept: PULMONOLOGY | Facility: CLINIC | Age: 74
End: 2024-12-11
Payer: MEDICARE

## 2024-12-11 RX ORDER — CETIRIZINE HYDROCHLORIDE 10 MG/1
10 TABLET ORAL DAILY
Qty: 30 TABLET | Refills: 5 | Status: SHIPPED | OUTPATIENT
Start: 2024-12-11

## 2024-12-11 NOTE — TELEPHONE ENCOUNTER
Notified wife to stop singulair and start zyrtec and that was called in to pharmacy. Spouse voiced understanding

## 2024-12-11 NOTE — TELEPHONE ENCOUNTER
Wife called she is worried that the singulair her  is taking is not the fit for him he is aggressive,hostile, trouble concentrating and remembering things, she wants to know if there is something else you can give him for his asthma ? Thank you

## 2024-12-16 NOTE — PROGRESS NOTES
[facility] Progress Note  Kem Joseph  MRN: [id number]  Age: 74 y.o. male   YOB: 1950      SUBJECTIVE  History of Presenting Illness   Patient is a 74 y.o. male presenting for follow-up for hemorrhoid evaluation. He initially presented on 10/11/2024 and described symptoms as issues with cleaning, concerns for some mild incontinence, with intermittent bleeding and some minimal pain, describes bowel movements as sometimes hard sometimes soft and sometimes loose, and usually spends less than 5 minutes on the commode with intermittent straining. A follow-up on 11/12/2024, reported that he was taking a fiber supplement, though his wife directed him not to take it on a daily basis, and he is doing somewhat better with out straining on the commode, and was taking sitz baths when he feels symptomatic. Stated that after prior banding, he felt much better for approximately 2 weeks, and had no issues with cleaning or bleeding, but then symptoms have slowly progressed back to where he feels they are the same prior to banding.    He returns to clinic today, 12/17/2024, and reports that he is doing very well and feels much better overall, without any other episodes of bleeding.  He has been consistently compliant with conservative management as previously discussed.    Prior treatment:   11/12/2024: RBL of RP column    10/11/2024: RBL of LL column    Colonoscopy: Does not remember date of last colonoscopy-is due in November of this year, states that he has never had concerning findings on a colonoscopy  Family hx of colorectal cancer: None  Anticoagulation/Antiplatelet meds: None    Past Medical History     Past Medical History:  Past Medical History:   Diagnosis Date    Hypertension        PCP: Paul Casas MD    Past Surgical History:  Past Surgical History:   Procedure Laterality Date    KNEE ARTHROSCOPY W/ LATERAL RELEASE / MEDIAL IMBRICATION      UMBILICAL HERNIA REPAIR          Family History:  Family History   Problem Relation Age of Onset    No Known Problems Mother     Colon cancer Neg Hx     Colon polyps Neg Hx     Esophageal cancer Neg Hx        Social History:  Social History     Tobacco Use    Smoking status: Former     Current packs/day: 1.00     Average packs/day: 1 pack/day for 55.0 years (55.0 ttl pk-yrs)     Types: Cigarettes     Start date: 1970     Passive exposure: Never    Smokeless tobacco: Never   Substance Use Topics    Alcohol use: Yes     Comment: wine daily       Allergies:  No Known Allergies    Medications:  Current Outpatient Medications   Medication Instructions    albuterol sulfate  (90 Base) MCG/ACT inhaler     allopurinol (ZYLOPRIM) 300 MG tablet 1 tablet, Daily    cetirizine (ZYRTEC) 10 mg, Oral, Daily    fluticasone (FLONASE) 50 MCG/ACT nasal spray     glipizide (GLUCOTROL) 5 MG tablet Take 1 tablet twice a day by oral route, for diabetes mellitus..    hydrALAZINE (APRESOLINE) 50 MG tablet 1 tablet, 2 Times Daily    HYDROcodone Bit-Homatrop MBr (HYCODAN) 5-1.5 MG/5ML solution     lidocaine (LIDODERM) 5 %     lisinopril (PRINIVIL,ZESTRIL) 20 MG tablet 1 tablet, Daily    meclizine (ANTIVERT) 12.5 MG tablet 2 tablets, 3 Times Daily    metFORMIN (GLUCOPHAGE) 500 MG tablet 1 tablet, 2 Times Daily    metoprolol succinate XL (TOPROL-XL) 50 MG 24 hr tablet     omeprazole (priLOSEC) 40 MG capsule 1 capsule, Daily    ondansetron (ZOFRAN) 4 MG tablet     oxyCODONE-acetaminophen (PERCOCET) 7.5-325 MG per tablet     predniSONE (DELTASONE) 20 MG tablet TAKE 1 TABLET BY MOUTH EVERY DAY FOR 7 DAYS AS DIRECTED    promethazine-dextromethorphan (PROMETHAZINE-DM) 6.25-15 MG/5ML syrup TAKE 5 ML BY MOUTH EVERY 6 HOURS FOR 10 DAYS AS NEEDED    simvastatin (ZOCOR) 40 MG tablet 1 tablet, Daily    Wixela Inhub 250-50 MCG/ACT DISKUS          Review of Systems   He otherwise denies lightheadedness, dizziness, fainting, passing out, headache, nausea, vomiting, chest pain,  "palpitations, shortness of breath, coughing, wheezing, heart burn, reflux, skin lesions, unintended weight loss/gain, sensitivity to heat/cold, changes in sensation, changes in vision/hearing/smell/taste, myalgias, arthralgias, and has no other acute complaints or concerning symptoms to report at this time.      Physical Examination     Vitals:    12/17/24 1051   BP: 158/98   BP Location: Right arm   Patient Position: Sitting   Cuff Size: Adult   Pulse: 77   SpO2: 94%   Weight: 78 kg (172 lb)   Height: 167.6 cm (66\")       Estimated body mass index is 27.76 kg/m² as calculated from the following:    Height as of this encounter: 167.6 cm (66\").    Weight as of this encounter: 78 kg (172 lb).  PREVIOUS WEIGHTS:   Wt Readings from Last 5 Encounters:   12/17/24 78 kg (172 lb)   11/14/24 78 kg (172 lb)   11/12/24 78.5 kg (173 lb)   10/31/24 78.7 kg (173 lb 9.6 oz)   10/11/24 76.8 kg (169 lb 6.4 oz)       Physical Examination:  Gen: awake, alert, sitting up in chair in no acute distress  HEENT: NCAT, EOMI, anicteric sclerae  CV: RRR, normotensive  Resp: nonlabored on room air, sats appropriate  Abd: soft, nontender, nondistended without palpable mass  Rectal:              External: Moderately enlarged right external hemorrhoid, small left-sided external hemorrhoid; both are nontender without sequela of bleeding, neither has evidence of thrombosis              ELTON: Large hemorrhoid columns, no stool or blood in the rectal vault              *Please refer to anoscopy report for further details.*  MSK: moves all four, no c/c/e  Neuro: no focal deficits, cranial nerves grossly intact  Psy:  appropriate, cooperative      Data Review   No recent labs or imaging to review.    Problem List     Patient Active Problem List   Diagnosis    Mild persistent asthma    Need for pneumococcal 20-valent conjugate vaccination    Former smoker    Chronic dyspnea    Grade II hemorrhoids    External hemorrhoid        Assessment/Plan   Kem " Jake is a 74 y.o. male with bleeding internal hemorrhoids, small relatively asymptomatic external hemorrhoids though they do cause issues with hygiene/cleaning.  We again discussed our plan to treat his internal hemorrhoidal disease with rubber band ligation, in hopes that this will decrease the vascular burden to the external hemorrhoids, along with conservative measures assisting to reduce the external disease.  It appears that this has been largely successful, and we will plan to complete treatment of the internal hemorrhoid disease today with rubber band ligation of the RA column.  He will follow-up as needed.      We again discussed the nature of hemorrhoids as physiologic rather than pathologic, the role of conservative management, and options for intervention including rubber band ligation and surgical hemorrhoidectomy.  We discussed that hemorrhoidectomy is reserved for refractory external hemorrhoids and internal hemorrhoids that do not respond to conservative management or banding. I have recommended rubber band ligation and have treated with RBL of the RA column.  He  was again counseled as to appropriate fiber and water intake, appropriate toileting habits, and he was provided information packet with this information prior to his discharge from clinic.  He was counseled to call our office or return to the emergency department with any significant bleeding per rectum, or any problems following examination/treatment today.  Ample time was provided for questions all of which were answered to the patient's apparent satisfaction.     Smoking cessation:        Smoking cessation: Never smoker-not indicated  BMI is >= 25 and <30. (Overweight) The following options were offered after discussion;: exercise counseling/recommendations and nutrition counseling/recommendations  Med rec complete: yes  VTE: VTE PPX is not indicated.     Time Spent: I spent 10 minutes caring for Kem Joseph on this date of service.  This time includes time, independent of any procedures, spent by me in the following activities: preparing for the clinic visit, reviewing tests, obtaining and/or reviewing a separately obtained history, performing a medically appropriate examination and/or evaluation, counseling and educating the patient/family/caregiver, ordering medications, tests, or procedures, and documenting information in the medical record.     Kt Erazo MD  12/17/2024   18:29 CST

## 2024-12-17 ENCOUNTER — OFFICE VISIT (OUTPATIENT)
Dept: SURGERY | Facility: CLINIC | Age: 74
End: 2024-12-17
Payer: MEDICARE

## 2024-12-17 VITALS
BODY MASS INDEX: 27.64 KG/M2 | SYSTOLIC BLOOD PRESSURE: 158 MMHG | DIASTOLIC BLOOD PRESSURE: 98 MMHG | HEART RATE: 77 BPM | HEIGHT: 66 IN | OXYGEN SATURATION: 94 % | WEIGHT: 172 LBS

## 2024-12-17 DIAGNOSIS — K64.1 GRADE II HEMORRHOIDS: ICD-10-CM

## 2024-12-17 DIAGNOSIS — K64.4 EXTERNAL HEMORRHOID: Primary | ICD-10-CM

## 2024-12-17 NOTE — LETTER
December 17, 2024     Paul Casas MD  [address] [facility] [place] [address]    Patient: Kem Joseph   YOB: 1950   Date of Visit: 12/17/2024     Dear Paul Casas MD:       I have seen Kem Joseph in my office today for ongoing evaluation and treatment of his anorectal complaints.  Overall, he is doing much better.  He has bleeding is generally resolved, and his external hemorrhoidal disease has improved with conservative management and treatment of the internal hemorrhoids with rubber band ligation.  During his visit today, we ligated the third internal hemorrhoid column, which she tolerated well.  He would like to follow-up with me only as needed, but knows to call or return to my clinic should he have any questions, concerns, or new symptoms..    Should you have any questions or concerns about his care, please feel free to reach out.  Thank you very much for this referral, and for involving me in Kem 's care.               Sincerely,        Kt Erazo MD        CC: Kt Driscoll MD  12/17/24 1826  Sign when Signing Visit    General Surgery Rubber Band Ligation and Anoscopy Procedure Note    Indication: Symptomatic hemorrhoids, bleeding internal hemorrhoids    Procedure: The patient was placed in the left lateral decubitus position.  The anoscope was gently inserted and the bowel was inspected.  Visualization was good.  Mucosa was generally pink and healthy appearing.  Anoscopy revealed greatly improved size of hemorrhoid columns in the right posterior and left lateral locations, enlarged, bulky right anterior column. Decision was made to place a rubber band on the RA hemorrhoid column, which appeared to be the most enlarged and inflamed, with no sequelae of recent bleeding. The pedicle of that hemorrhoid was grasped and retracted into the McGiveny ligator, and two bands were applied in the usual fashion. The patient tolerated this well, without  significant discomfort or significant bleeding.     Information sheets, follow-up appointments, and return precautions were given to the patient prior to discharge from clinic.       Kt Erazo MD  12/17/2024   11:12 CST    Kt Erazo MD  12/17/24 1829  Sign when Signing Visit    [facility] Progress Note  Kem Joseph  MRN: [id number]  Age: 74 y.o. male   YOB: 1950      SUBJECTIVE  History of Presenting Illness   Patient is a 74 y.o. male presenting for follow-up for hemorrhoid evaluation. He initially presented on 10/11/2024 and described symptoms as issues with cleaning, concerns for some mild incontinence, with intermittent bleeding and some minimal pain, describes bowel movements as sometimes hard sometimes soft and sometimes loose, and usually spends less than 5 minutes on the commode with intermittent straining. A follow-up on 11/12/2024, reported that he was taking a fiber supplement, though his wife directed him not to take it on a daily basis, and he is doing somewhat better with out straining on the commode, and was taking sitz baths when he feels symptomatic. Stated that after prior banding, he felt much better for approximately 2 weeks, and had no issues with cleaning or bleeding, but then symptoms have slowly progressed back to where he feels they are the same prior to banding.    He returns to clinic today, 12/17/2024, and reports that he is doing very well and feels much better overall, without any other episodes of bleeding.  He has been consistently compliant with conservative management as previously discussed.    Prior treatment:   11/12/2024: RBL of RP column    10/11/2024: RBL of LL column    Colonoscopy: Does not remember date of last colonoscopy-is due in November of this year, states that he has never had concerning findings on a colonoscopy  Family hx of colorectal cancer: None  Anticoagulation/Antiplatelet meds: None    Past  Medical History     Past Medical History:  Past Medical History:   Diagnosis Date   • Hypertension        PCP: Paul Casas MD    Past Surgical History:  Past Surgical History:   Procedure Laterality Date   • KNEE ARTHROSCOPY W/ LATERAL RELEASE / MEDIAL IMBRICATION     • UMBILICAL HERNIA REPAIR         Family History:  Family History   Problem Relation Age of Onset   • No Known Problems Mother    • Colon cancer Neg Hx    • Colon polyps Neg Hx    • Esophageal cancer Neg Hx        Social History:  Social History     Tobacco Use   • Smoking status: Former     Current packs/day: 1.00     Average packs/day: 1 pack/day for 55.0 years (55.0 ttl pk-yrs)     Types: Cigarettes     Start date: 1970     Passive exposure: Never   • Smokeless tobacco: Never   Substance Use Topics   • Alcohol use: Yes     Comment: wine daily       Allergies:  No Known Allergies    Medications:  Current Outpatient Medications   Medication Instructions   • albuterol sulfate  (90 Base) MCG/ACT inhaler    • allopurinol (ZYLOPRIM) 300 MG tablet 1 tablet, Daily   • cetirizine (ZYRTEC) 10 mg, Oral, Daily   • fluticasone (FLONASE) 50 MCG/ACT nasal spray    • glipizide (GLUCOTROL) 5 MG tablet Take 1 tablet twice a day by oral route, for diabetes mellitus..   • hydrALAZINE (APRESOLINE) 50 MG tablet 1 tablet, 2 Times Daily   • HYDROcodone Bit-Homatrop MBr (HYCODAN) 5-1.5 MG/5ML solution    • lidocaine (LIDODERM) 5 %    • lisinopril (PRINIVIL,ZESTRIL) 20 MG tablet 1 tablet, Daily   • meclizine (ANTIVERT) 12.5 MG tablet 2 tablets, 3 Times Daily   • metFORMIN (GLUCOPHAGE) 500 MG tablet 1 tablet, 2 Times Daily   • metoprolol succinate XL (TOPROL-XL) 50 MG 24 hr tablet    • omeprazole (priLOSEC) 40 MG capsule 1 capsule, Daily   • ondansetron (ZOFRAN) 4 MG tablet    • oxyCODONE-acetaminophen (PERCOCET) 7.5-325 MG per tablet    • predniSONE (DELTASONE) 20 MG tablet TAKE 1 TABLET BY MOUTH EVERY DAY FOR 7 DAYS AS DIRECTED   •  "promethazine-dextromethorphan (PROMETHAZINE-DM) 6.25-15 MG/5ML syrup TAKE 5 ML BY MOUTH EVERY 6 HOURS FOR 10 DAYS AS NEEDED   • simvastatin (ZOCOR) 40 MG tablet 1 tablet, Daily   • Wixela Inhub 250-50 MCG/ACT DISKUS          Review of Systems   He otherwise denies lightheadedness, dizziness, fainting, passing out, headache, nausea, vomiting, chest pain, palpitations, shortness of breath, coughing, wheezing, heart burn, reflux, skin lesions, unintended weight loss/gain, sensitivity to heat/cold, changes in sensation, changes in vision/hearing/smell/taste, myalgias, arthralgias, and has no other acute complaints or concerning symptoms to report at this time.      Physical Examination     Vitals:    12/17/24 1051   BP: 158/98   BP Location: Right arm   Patient Position: Sitting   Cuff Size: Adult   Pulse: 77   SpO2: 94%   Weight: 78 kg (172 lb)   Height: 167.6 cm (66\")       Estimated body mass index is 27.76 kg/m² as calculated from the following:    Height as of this encounter: 167.6 cm (66\").    Weight as of this encounter: 78 kg (172 lb).  PREVIOUS WEIGHTS:   Wt Readings from Last 5 Encounters:   12/17/24 78 kg (172 lb)   11/14/24 78 kg (172 lb)   11/12/24 78.5 kg (173 lb)   10/31/24 78.7 kg (173 lb 9.6 oz)   10/11/24 76.8 kg (169 lb 6.4 oz)       Physical Examination:  Gen: awake, alert, sitting up in chair in no acute distress  HEENT: NCAT, EOMI, anicteric sclerae  CV: RRR, normotensive  Resp: nonlabored on room air, sats appropriate  Abd: soft, nontender, nondistended without palpable mass  Rectal:              External: Moderately enlarged right external hemorrhoid, small left-sided external hemorrhoid; both are nontender without sequela of bleeding, neither has evidence of thrombosis              ELTON: Large hemorrhoid columns, no stool or blood in the rectal vault              *Please refer to anoscopy report for further details.*  MSK: moves all four, no c/c/e  Neuro: no focal deficits, cranial nerves grossly " intact  Psy:  appropriate, cooperative      Data Review   No recent labs or imaging to review.    Problem List     Patient Active Problem List   Diagnosis   • Mild persistent asthma   • Need for pneumococcal 20-valent conjugate vaccination   • Former smoker   • Chronic dyspnea   • Grade II hemorrhoids   • External hemorrhoid        Assessment/Plan   Kem Joseph is a 74 y.o. male with bleeding internal hemorrhoids, small relatively asymptomatic external hemorrhoids though they do cause issues with hygiene/cleaning.  We again discussed our plan to treat his internal hemorrhoidal disease with rubber band ligation, in hopes that this will decrease the vascular burden to the external hemorrhoids, along with conservative measures assisting to reduce the external disease.  It appears that this has been largely successful, and we will plan to complete treatment of the internal hemorrhoid disease today with rubber band ligation of the RA column.  He will follow-up as needed.      We again discussed the nature of hemorrhoids as physiologic rather than pathologic, the role of conservative management, and options for intervention including rubber band ligation and surgical hemorrhoidectomy.  We discussed that hemorrhoidectomy is reserved for refractory external hemorrhoids and internal hemorrhoids that do not respond to conservative management or banding. I have recommended rubber band ligation and have treated with RBL of the RA column.  He  was again counseled as to appropriate fiber and water intake, appropriate toileting habits, and he was provided information packet with this information prior to his discharge from clinic.  He was counseled to call our office or return to the emergency department with any significant bleeding per rectum, or any problems following examination/treatment today.  Ample time was provided for questions all of which were answered to the patient's apparent satisfaction.     Smoking cessation:         Smoking cessation: Never smoker-not indicated  BMI is >= 25 and <30. (Overweight) The following options were offered after discussion;: exercise counseling/recommendations and nutrition counseling/recommendations  Med rec complete: yes  VTE: VTE PPX is not indicated.     Time Spent: I spent 10 minutes caring for Kem Joseph on this date of service. This time includes time, independent of any procedures, spent by me in the following activities: preparing for the clinic visit, reviewing tests, obtaining and/or reviewing a separately obtained history, performing a medically appropriate examination and/or evaluation, counseling and educating the patient/family/caregiver, ordering medications, tests, or procedures, and documenting information in the medical record.     Kt Erazo MD  12/17/2024   18:29 CST

## 2024-12-17 NOTE — PROGRESS NOTES
General Surgery Rubber Band Ligation and Anoscopy Procedure Note    Indication: Symptomatic hemorrhoids, bleeding internal hemorrhoids    Procedure: The patient was placed in the left lateral decubitus position.  The anoscope was gently inserted and the bowel was inspected.  Visualization was good.  Mucosa was generally pink and healthy appearing.  Anoscopy revealed greatly improved size of hemorrhoid columns in the right posterior and left lateral locations, enlarged, bulky right anterior column. Decision was made to place a rubber band on the RA hemorrhoid column, which appeared to be the most enlarged and inflamed, with no sequelae of recent bleeding. The pedicle of that hemorrhoid was grasped and retracted into the McGiveny ligator, and two bands were applied in the usual fashion. The patient tolerated this well, without significant discomfort or significant bleeding.     Information sheets, follow-up appointments, and return precautions were given to the patient prior to discharge from clinic.       Kt Erazo MD  12/17/2024   11:12 CST

## 2024-12-17 NOTE — PATIENT INSTRUCTIONS
General Surgery Clinic Discharge Instructions      Kem Joseph  12/17/24      Diagnosis: Symptomatic hemorrhoids    Medications/Treatments:   (Take all medications as prescribed by your provider)  Continue conservative management as previously discussed.    Surgical Plan: No plans for surgical intervention at this time.      Follow-up: As needed      Please call our office at [phone] with any issues, questions, or concerns.      Kt Erazo MD  12/17/2024   11:11 CST         General Surgery Clinic Discharge Instructions: RUBBER BAND LIGATION OF HEMORRHOIDS    This method involves the application of small rubber bands to the hemorrhoids. The bands, being elastic, tighten down and destroy the hemorrhoids trapped inside the band, just as if they were cut out with a scalpel.  Only internal hemorrhoids are treated this way. These hemorrhoids do not have “pain” fibers. For this reason, we do not have to use any anesthetic. You will feel and hear a snap or thump as the bands are applied. About 25% of patients experience mild, dull anal discomfort lasting for 2-3 days following the procedure. You may feel a dull ache, not severe pain, for a few hours. Many like to describe the feeling of discomfort thus: “I feel as if I want to have a bowel movement” or “there seems to be stool in my rectum near the opening”. This feeling will occur as soon as the rubber bands are applied. Sitting in a hot tub will usually relieve this sensation.  In 24-48 hours, the hemorrhoid caught in the rubber band will be completely destroyed. The destroyed hemorrhoid will drop off in approximately 7-14 days. You will usually not notice this, although some patients may notice spotting at that time. By avoiding constipation, straining or loose stools, you should experience no trouble with marked bleeding.      AFTER TREATMENT  Activity  You may go ahead with your usual activity.  You may drive your car immediately after the  treatment.  AVOID heavy lifting, if possible (for the first 24 hours after your procedure.)    Diet  You can eat what you like, but follow fiber recommendations per the information sheet we provided to you today  Drink a MINIMUM of 10 glasses of fluid daily.    Medications  Take your medications as prescribed.  If you usually take Aspirin 81mg or 325mg per day you may continue to take this,  otherwise NO aspirin for 14 days.  TYLENOL (not aspirin) if needed, may be taken for pain.  Take warm sitz baths 3-4 times daily, especially after each bowel movement, 15 minutes at a time. The water temperature should be as hot as you can tolerate. Check the water temperature with your elbow. You will find the sitz bath soothing and relaxing.    Bowel Movements  DO NOT STRAIN to have a bowel movement.  DO NOT SIT on the toilet for more than 5 minutes.  NEVER read while sitting on the toilet! Take the library & [employer] OUT of the bathroom    Problems  Some spotting or bleeding is to be expected even up to 14 days after the procedure.  Report to the ER immediately if there is profuse bleeding.  About 2% of the patients treated with Rubber Band Ligation develop thrombosis of an external hemorrhoid which may require excision.    Hemorrhoids may reoccur. The best preventive measures are proper diet and toilet habits.    REMEMBER: RECTAL BLEEDING, WHICH MAY APPEAR IN THE FUTURE, SHOULD NOT BE DISREGARDED. OTHER CAUSES OF BLEEDING SUCH AS FISSURES, POLYPS OR CANCER CAN APPEAR AND SHOULD BE INVESTIGATED.

## 2024-12-30 RX ORDER — CETIRIZINE HYDROCHLORIDE 10 MG/1
10 TABLET ORAL DAILY
Qty: 90 TABLET | Refills: 3 | Status: SHIPPED | OUTPATIENT
Start: 2024-12-30

## 2024-12-30 NOTE — TELEPHONE ENCOUNTER
Patient came into the office asking if we could send a 90 day supply of zyrtec to [facility] instead of using [facility].    Rx Refill Note  Requested Prescriptions     Pending Prescriptions Disp Refills    cetirizine (zyrTEC) 10 MG tablet 90 tablet 3     Sig: Take 1 tablet by mouth Daily.      Last office visit with prescribing clinician: 10/31/2024   Last telemedicine visit with prescribing clinician: Visit date not found   Next office visit with prescribing clinician: 2/3/2025                         Would you like a call back once the refill request has been completed: [] Yes [] No    If the office needs to give you a call back, can they leave a voicemail: [] Yes [] No    Gabriela Choudhury MA  12/30/24, 15:49 CST

## 2025-01-16 ENCOUNTER — TRANSCRIBE ORDERS (OUTPATIENT)
Dept: ADMINISTRATIVE | Facility: HOSPITAL | Age: 75
End: 2025-01-16
Payer: MEDICARE

## 2025-01-16 DIAGNOSIS — Z13.6 ENCOUNTER FOR SCREENING FOR CARDIOVASCULAR DISORDERS: Primary | ICD-10-CM

## 2025-01-28 ENCOUNTER — HOSPITAL ENCOUNTER (OUTPATIENT)
Dept: ULTRASOUND IMAGING | Facility: HOSPITAL | Age: 75
Discharge: HOME | End: 2025-01-28
Admitting: FAMILY MEDICINE
Payer: MEDICARE

## 2025-01-28 DIAGNOSIS — Z13.6 ENCOUNTER FOR SCREENING FOR CARDIOVASCULAR DISORDERS: ICD-10-CM

## 2025-01-28 PROCEDURE — 76706 US ABDL AORTA SCREEN AAA: CPT

## 2025-02-03 ENCOUNTER — OFFICE VISIT (OUTPATIENT)
Dept: PULMONOLOGY | Facility: CLINIC | Age: 75
End: 2025-02-03
Payer: MEDICARE

## 2025-02-03 VITALS — HEART RATE: 74 BPM | OXYGEN SATURATION: 95 % | HEIGHT: 66 IN | BODY MASS INDEX: 27.64 KG/M2 | WEIGHT: 172 LBS

## 2025-02-03 DIAGNOSIS — R06.09 CHRONIC DYSPNEA: ICD-10-CM

## 2025-02-03 DIAGNOSIS — J45.30 MILD PERSISTENT ASTHMA, UNSPECIFIED WHETHER COMPLICATED: Primary | ICD-10-CM

## 2025-02-03 DIAGNOSIS — Z87.891 FORMER SMOKER: ICD-10-CM

## 2025-02-03 PROCEDURE — 99213 OFFICE O/P EST LOW 20 MIN: CPT | Performed by: INTERNAL MEDICINE

## 2025-02-03 RX ORDER — FLUTICASONE PROPIONATE 50 MCG
2 SPRAY, SUSPENSION (ML) NASAL DAILY PRN
Qty: 9.9 G | Refills: 5 | Status: SHIPPED | OUTPATIENT
Start: 2025-02-03

## 2025-02-03 NOTE — LETTER
February 3, 2025     Paul Casas MD   [facility] [address]    Patient: Kem Joseph   YOB: 1950   Date of Visit: 2/3/2025     Dear Dr. Augusto MD:    Thank you for referring Kem Joseph to me for evaluation. Below are the relevant portions of my assessment and plan of care.    If you have questions, please do not hesitate to call me. I look forward to following Kem along with you.         Sincerely,        Jessicantmatthew Norwood,         CC: No Recipients      Progress Notes:    Clinic Note - Follow Up            NAME: Kem Joseph  MRN: [id number]  AGE: 74 y.o.            : 1950  PRIMARY CARE PROVIDER: Paul Casas MD               Reason for Visit:  Kem Joseph presents to clinic today for follow up for the evaluation of mild persistent asthma.    History of Present Illness:  Mr. Joseph is a 74-year-old male who presents to clinic today as a new patient. Past medical history includes mild persistent asthma, former tobacco use, hypertension.     Mr. Joseph was last seen in clinic as a new patient on 10/31/2024.  At that visit his Wixela was continued and he was started on Zyrtec and Singulair.  In the interim his wife called the clinic due to changes in behavior since starting the Singulair.  His Singulair was discontinued several months ago.  He continues on Zyrtec, Wixela, as needed albuterol.  With this regimen today he states his asthma is well-controlled.  He denies any daytime wheezing or shortness of breath.  No nighttime symptoms.  No recent admissions, ER, urgent care visits for his breathing.  He has not required steroids recently.    Review of Systems:  A full 12-point review of systems was performed and was negative except as documented in the HPI.            Physical Exam:  General: No acute distress, cooperative.  Head: Atraumatic, normocephalic, normal inspection.  Eyes: EOMI, normal inspection.  ENT: Mucous membranes moist, normal  inspection. No thrush.  Heart: RRR, no murmur  Lungs: Clear to auscultation, no wheezing  MSK: No edema or clubbing  GI/abdominal: Soft, nontender.  Neurologic: Alert, oriented.  Cranial nerves II through XII grossly intact.      Imaging Review:  None available for review      Pulmonary Functions Testing Results:              Problem List:  Problem List Items Addressed This Visit       Mild persistent asthma - Primary    Former smoker    Chronic dyspnea         Pulmonary Assessment:  74-year-old male presents to clinic for management of asthma.  His asthma is currently well-controlled with Wixela, as needed albuterol, Zyrtec.  He did not tolerate Singulair.      Plan:   -Continue current regimen with Wixela and as needed albuterol  -Continue Zyrtec  -He does have occasional rhinitis, will prescribe Flonase as needed           Follow Up:  1 year, or sooner if needed         Thank you Paul Casas MD for allowing me to participate in this patient's care.           Past Medical History:   Past Medical History:   Diagnosis Date   • Hypertension          Past Surgical History:   Past Surgical History:   Procedure Laterality Date   • KNEE ARTHROSCOPY W/ LATERAL RELEASE / MEDIAL IMBRICATION     • UMBILICAL HERNIA REPAIR           Family History:   Family History   Problem Relation Age of Onset   • No Known Problems Mother    • Colon cancer Neg Hx    • Colon polyps Neg Hx    • Esophageal cancer Neg Hx          Medications:   Prior to Admission medications    Medication Sig Start Date End Date Taking? Authorizing Provider   albuterol sulfate  (90 Base) MCG/ACT inhaler    Yes ProviderGaro MD   allopurinol (ZYLOPRIM) 300 MG tablet Take 1 tablet by mouth Daily. 9/26/24  Yes Garo Stone MD   cetirizine (zyrTEC) 10 MG tablet Take 1 tablet by mouth Daily. 12/30/24  Yes Sam Norwood,    fluticasone (FLONASE) 50 MCG/ACT nasal spray Administer 2 sprays into the nostril(s) as directed  "by provider Daily As Needed for Rhinitis. 2/3/25  Yes Sam Norwood,    glipizide (GLUCOTROL) 5 MG tablet Take 1 tablet twice a day by oral route, for diabetes mellitus.. 9/26/24  Yes Garo Stone MD   hydrALAZINE (APRESOLINE) 50 MG tablet Take 1 tablet by mouth 2 (Two) Times a Day.   Yes Garo Stone MD   HYDROcodone Bit-Homatrop MBr (HYCODAN) 5-1.5 MG/5ML solution    Yes Garo Stone MD   lidocaine (LIDODERM) 5 %    Yes Provider, MD Garo   lisinopril (PRINIVIL,ZESTRIL) 20 MG tablet Take 1 tablet by mouth Daily. 9/26/24  Yes Garo Stone MD   meclizine (ANTIVERT) 12.5 MG tablet Take 2 tablets by mouth 3 (Three) Times a Day.   Yes Garo Stone MD   metFORMIN (GLUCOPHAGE) 500 MG tablet Take 1 tablet by mouth 2 (Two) Times a Day.   Yes ProviderGaro MD   metoprolol succinate XL (TOPROL-XL) 50 MG 24 hr tablet    Yes Provider, MD Garo   omeprazole (priLOSEC) 40 MG capsule Take 1 capsule by mouth Daily. 9/26/24  Yes Garo Stone MD   ondansetron (ZOFRAN) 4 MG tablet    Yes ProviderGaro MD   oxyCODONE-acetaminophen (PERCOCET) 7.5-325 MG per tablet    Yes ProviderGaro MD   predniSONE (DELTASONE) 20 MG tablet TAKE 1 TABLET BY MOUTH EVERY DAY FOR 7 DAYS AS DIRECTED   Yes Provider, MD Garo   promethazine-dextromethorphan (PROMETHAZINE-DM) 6.25-15 MG/5ML syrup TAKE 5 ML BY MOUTH EVERY 6 HOURS FOR 10 DAYS AS NEEDED   Yes ProviderGaro MD   simvastatin (ZOCOR) 40 MG tablet Take 1 tablet by mouth Daily. 9/26/24  Yes Garo Stone MD Wixela Inhub 250-50 MCG/ACT DISKUS  8/25/24  Yes Garo Stone MD   fluticasone (FLONASE) 50 MCG/ACT nasal spray   2/3/25 Yes ProviderGaro MD         Allergies:   Patient has no known allergies.      Results Review:             Visit Vitals  Pulse 74   Ht 167.6 cm (66\")   Wt 78 kg (172 lb)   SpO2 95% Comment: RA   BMI 27.76 kg/m²           Social " History:     Social History     Socioeconomic History   • Marital status:    Tobacco Use   • Smoking status: Former     Current packs/day: 1.00     Average packs/day: 1 pack/day for 55.1 years (55.1 ttl pk-yrs)     Types: Cigarettes     Start date: 1970     Passive exposure: Never   • Smokeless tobacco: Never   Vaping Use   • Vaping status: Never Used   Substance and Sexual Activity   • Alcohol use: Yes     Comment: wine daily   • Drug use: Never   • Sexual activity: Defer                Sam Norwood DO  Pulmonary/Critical Care  2/3/2025  13:47 CST

## 2025-02-03 NOTE — PROGRESS NOTES
Clinic Note - Follow Up            NAME: Kem Joseph  MRN: [id number]  AGE: 74 y.o.            : 1950  PRIMARY CARE PROVIDER: Paul Casas MD               Reason for Visit:  Kem Joseph presents to clinic today for follow up for the evaluation of mild persistent asthma.    History of Present Illness:  Mr. Joseph is a 74-year-old male who presents to clinic today as a new patient. Past medical history includes mild persistent asthma, former tobacco use, hypertension.     Mr. Joseph was last seen in clinic as a new patient on 10/31/2024.  At that visit his Wixela was continued and he was started on Zyrtec and Singulair.  In the interim his wife called the clinic due to changes in behavior since starting the Singulair.  His Singulair was discontinued several months ago.  He continues on Zyrtec, Wixela, as needed albuterol.  With this regimen today he states his asthma is well-controlled.  He denies any daytime wheezing or shortness of breath.  No nighttime symptoms.  No recent admissions, ER, urgent care visits for his breathing.  He has not required steroids recently.    Review of Systems:  A full 12-point review of systems was performed and was negative except as documented in the HPI.            Physical Exam:  General: No acute distress, cooperative.  Head: Atraumatic, normocephalic, normal inspection.  Eyes: EOMI, normal inspection.  ENT: Mucous membranes moist, normal inspection. No thrush.  Heart: RRR, no murmur  Lungs: Clear to auscultation, no wheezing  MSK: No edema or clubbing  GI/abdominal: Soft, nontender.  Neurologic: Alert, oriented.  Cranial nerves II through XII grossly intact.      Imaging Review:  None available for review      Pulmonary Functions Testing Results:              Problem List:  Problem List Items Addressed This Visit       Mild persistent asthma - Primary    Former smoker    Chronic dyspnea         Pulmonary Assessment:  74-year-old male presents to clinic for  management of asthma.  His asthma is currently well-controlled with Wixela, as needed albuterol, Zyrtec.  He did not tolerate Singulair.      Plan:   -Continue current regimen with Wixela and as needed albuterol  -Continue Zyrtec  -He does have occasional rhinitis, will prescribe Flonase as needed           Follow Up:  1 year, or sooner if needed         Thank you Paul Casas MD for allowing me to participate in this patient's care.           Past Medical History:   Past Medical History:   Diagnosis Date    Hypertension          Past Surgical History:   Past Surgical History:   Procedure Laterality Date    KNEE ARTHROSCOPY W/ LATERAL RELEASE / MEDIAL IMBRICATION      UMBILICAL HERNIA REPAIR           Family History:   Family History   Problem Relation Age of Onset    No Known Problems Mother     Colon cancer Neg Hx     Colon polyps Neg Hx     Esophageal cancer Neg Hx          Medications:   Prior to Admission medications    Medication Sig Start Date End Date Taking? Authorizing Provider   albuterol sulfate  (90 Base) MCG/ACT inhaler    Yes Garo Stone MD   allopurinol (ZYLOPRIM) 300 MG tablet Take 1 tablet by mouth Daily. 9/26/24  Yes Garo Sotne MD   cetirizine (zyrTEC) 10 MG tablet Take 1 tablet by mouth Daily. 12/30/24  Yes Sam Norwood, DO   fluticasone (FLONASE) 50 MCG/ACT nasal spray Administer 2 sprays into the nostril(s) as directed by provider Daily As Needed for Rhinitis. 2/3/25  Yes Sam Norwood, DO   glipizide (GLUCOTROL) 5 MG tablet Take 1 tablet twice a day by oral route, for diabetes mellitus.. 9/26/24  Yes Garo Stone MD   hydrALAZINE (APRESOLINE) 50 MG tablet Take 1 tablet by mouth 2 (Two) Times a Day.   Yes Garo Stone MD   HYDROcodone Bit-Homatrop MBr (HYCODAN) 5-1.5 MG/5ML solution    Yes Garo Stone MD   lidocaine (LIDODERM) 5 %    Yes Garo Stone MD   lisinopril (PRINIVIL,ZESTRIL) 20 MG tablet  "Take 1 tablet by mouth Daily. 9/26/24  Yes Garo Stone MD   meclizine (ANTIVERT) 12.5 MG tablet Take 2 tablets by mouth 3 (Three) Times a Day.   Yes Garo Stone MD   metFORMIN (GLUCOPHAGE) 500 MG tablet Take 1 tablet by mouth 2 (Two) Times a Day.   Yes Garo Stone MD   metoprolol succinate XL (TOPROL-XL) 50 MG 24 hr tablet    Yes Garo Stone MD   omeprazole (priLOSEC) 40 MG capsule Take 1 capsule by mouth Daily. 9/26/24  Yes Garo Stone MD   ondansetron (ZOFRAN) 4 MG tablet    Yes Garo Stone MD   oxyCODONE-acetaminophen (PERCOCET) 7.5-325 MG per tablet    Yes Garo Stone MD   predniSONE (DELTASONE) 20 MG tablet TAKE 1 TABLET BY MOUTH EVERY DAY FOR 7 DAYS AS DIRECTED   Yes Garo Stone MD   promethazine-dextromethorphan (PROMETHAZINE-DM) 6.25-15 MG/5ML syrup TAKE 5 ML BY MOUTH EVERY 6 HOURS FOR 10 DAYS AS NEEDED   Yes Garo Stone MD   simvastatin (ZOCOR) 40 MG tablet Take 1 tablet by mouth Daily. 9/26/24  Yes Garo Stone MD   Wixela Inhub 250-50 MCG/ACT DISKUS  8/25/24  Yes Garo Stone MD   fluticasone (FLONASE) 50 MCG/ACT nasal spray   2/3/25 Yes Garo Stone MD         Allergies:   Patient has no known allergies.      Results Review:             Visit Vitals  Pulse 74   Ht 167.6 cm (66\")   Wt 78 kg (172 lb)   SpO2 95% Comment: RA   BMI 27.76 kg/m²           Social History:     Social History     Socioeconomic History    Marital status:    Tobacco Use    Smoking status: Former     Current packs/day: 1.00     Average packs/day: 1 pack/day for 55.1 years (55.1 ttl pk-yrs)     Types: Cigarettes     Start date: 1970     Passive exposure: Never    Smokeless tobacco: Never   Vaping Use    Vaping status: Never Used   Substance and Sexual Activity    Alcohol use: Yes     Comment: wine daily    Drug use: Never    Sexual activity: Defer                Sam Norwood DO  Pulmonary/Critical " Care  2/3/2025  13:47 CST

## 2025-02-21 RX ORDER — DEXTROMETHORPHAN HYDROBROMIDE AND PROMETHAZINE HYDROCHLORIDE 15; 6.25 MG/5ML; MG/5ML
5 SYRUP ORAL 4 TIMES DAILY PRN
Qty: 118 ML | Refills: 0 | Status: SHIPPED | OUTPATIENT
Start: 2025-02-21

## 2025-02-21 NOTE — TELEPHONE ENCOUNTER
Patient called wanting a refill on promethazine-dextromethorphan sent to pharmacy. Another provider prescribed this medication in the past.

## 2025-04-03 ENCOUNTER — RESULTS FOLLOW-UP (OUTPATIENT)
Dept: GASTROENTEROLOGY | Facility: CLINIC | Age: 75
End: 2025-04-03
Payer: MEDICARE

## 2025-04-03 NOTE — TELEPHONE ENCOUNTER
----- Message from Oj Monge sent at 4/1/2025  4:06 PM CDT -----  I reviewed his colonoscopy    Polyps were removed and it was recommended to have a 3 years recall    Please call to see if he wishes to schedule procedure      Thank you  ----- Message -----  From: Nick, Jori Incoming  Sent: 11/15/2024   1:29 PM CDT  To: NIKOLE Mckeon

## 2025-04-03 NOTE — TELEPHONE ENCOUNTER
Spoke with patient , ready to schedule     Scheduled Colonoscopy for 05/28/2025 at 1215pm     Need prep and case request     Thank you

## 2025-04-04 DIAGNOSIS — Z86.0100 HISTORY OF COLON POLYPS: Primary | ICD-10-CM

## 2025-05-19 RX ORDER — CETIRIZINE HYDROCHLORIDE 10 MG/1
10 TABLET ORAL DAILY
Qty: 90 TABLET | Refills: 3 | Status: SHIPPED | OUTPATIENT
Start: 2025-05-19

## 2025-05-19 NOTE — TELEPHONE ENCOUNTER
Did not pass protocol.   Rx Refill Note  Requested Prescriptions      No prescriptions requested or ordered in this encounter      Last office visit with prescribing clinician: 2/3/2025   Last telemedicine visit with prescribing clinician: Visit date not found   Next office visit with prescribing clinician: 2/4/2026                         Would you like a call back once the refill request has been completed: [] Yes [] No    If the office needs to give you a call back, can they leave a voicemail: [] Yes [] No    Jessica Barcenas MA  05/19/25, 07:21 CDT

## 2025-05-28 ENCOUNTER — HOSPITAL ENCOUNTER (OUTPATIENT)
Facility: HOSPITAL | Age: 75
Setting detail: HOSPITAL OUTPATIENT SURGERY
Discharge: HOME | End: 2025-05-28
Attending: INTERNAL MEDICINE | Admitting: INTERNAL MEDICINE
Payer: MEDICARE

## 2025-05-28 ENCOUNTER — ANESTHESIA EVENT (OUTPATIENT)
Dept: GASTROENTEROLOGY | Facility: HOSPITAL | Age: 75
End: 2025-05-28
Payer: MEDICARE

## 2025-05-28 ENCOUNTER — ANESTHESIA (OUTPATIENT)
Dept: GASTROENTEROLOGY | Facility: HOSPITAL | Age: 75
End: 2025-05-28
Payer: MEDICARE

## 2025-05-28 VITALS
DIASTOLIC BLOOD PRESSURE: 69 MMHG | HEIGHT: 66 IN | WEIGHT: 177 LBS | RESPIRATION RATE: 13 BRPM | SYSTOLIC BLOOD PRESSURE: 90 MMHG | TEMPERATURE: 97.3 F | BODY MASS INDEX: 28.45 KG/M2 | OXYGEN SATURATION: 97 % | HEART RATE: 79 BPM

## 2025-05-28 DIAGNOSIS — Z86.0100 HISTORY OF COLON POLYPS: ICD-10-CM

## 2025-05-28 LAB — GLUCOSE BLDC GLUCOMTR-MCNC: 145 MG/DL (ref 70–130)

## 2025-05-28 PROCEDURE — 25010000002 LIDOCAINE PF 2% 2 % SOLUTION: Performed by: NURSE ANESTHETIST, CERTIFIED REGISTERED

## 2025-05-28 PROCEDURE — 82948 REAGENT STRIP/BLOOD GLUCOSE: CPT

## 2025-05-28 PROCEDURE — 25810000003 SODIUM CHLORIDE 0.9 % SOLUTION: Performed by: ANESTHESIOLOGY

## 2025-05-28 PROCEDURE — 25810000003 SODIUM CHLORIDE 0.9 % SOLUTION: Performed by: NURSE ANESTHETIST, CERTIFIED REGISTERED

## 2025-05-28 PROCEDURE — 25010000002 PROPOFOL 10 MG/ML EMULSION: Performed by: NURSE ANESTHETIST, CERTIFIED REGISTERED

## 2025-05-28 RX ORDER — SODIUM CHLORIDE 9 MG/ML
INJECTION, SOLUTION INTRAVENOUS CONTINUOUS PRN
Status: DISCONTINUED | OUTPATIENT
Start: 2025-05-28 | End: 2025-05-28 | Stop reason: SURG

## 2025-05-28 RX ORDER — LIDOCAINE HYDROCHLORIDE 10 MG/ML
0.5 INJECTION, SOLUTION EPIDURAL; INFILTRATION; INTRACAUDAL; PERINEURAL ONCE AS NEEDED
Status: DISCONTINUED | OUTPATIENT
Start: 2025-05-28 | End: 2025-05-28 | Stop reason: HOSPADM

## 2025-05-28 RX ORDER — SODIUM CHLORIDE 0.9 % (FLUSH) 0.9 %
10 SYRINGE (ML) INJECTION AS NEEDED
Status: DISCONTINUED | OUTPATIENT
Start: 2025-05-28 | End: 2025-05-28 | Stop reason: HOSPADM

## 2025-05-28 RX ORDER — PROPOFOL 10 MG/ML
VIAL (ML) INTRAVENOUS AS NEEDED
Status: DISCONTINUED | OUTPATIENT
Start: 2025-05-28 | End: 2025-05-28 | Stop reason: SURG

## 2025-05-28 RX ORDER — SODIUM CHLORIDE 9 MG/ML
500 INJECTION, SOLUTION INTRAVENOUS ONCE
Status: COMPLETED | OUTPATIENT
Start: 2025-05-28 | End: 2025-05-28

## 2025-05-28 RX ORDER — LIDOCAINE HYDROCHLORIDE 20 MG/ML
INJECTION, SOLUTION EPIDURAL; INFILTRATION; INTRACAUDAL; PERINEURAL AS NEEDED
Status: DISCONTINUED | OUTPATIENT
Start: 2025-05-28 | End: 2025-05-28 | Stop reason: SURG

## 2025-05-28 RX ADMIN — SODIUM CHLORIDE 500 ML: 9 INJECTION, SOLUTION INTRAVENOUS at 11:29

## 2025-05-28 RX ADMIN — PROPOFOL 180 MG: 10 INJECTION, EMULSION INTRAVENOUS at 12:24

## 2025-05-28 RX ADMIN — SODIUM CHLORIDE: 9 INJECTION, SOLUTION INTRAVENOUS at 12:21

## 2025-05-28 RX ADMIN — LIDOCAINE HYDROCHLORIDE 100 MG: 20 INJECTION, SOLUTION EPIDURAL; INFILTRATION; INTRACAUDAL; PERINEURAL at 12:24

## 2025-05-28 NOTE — H&P
[facility]  Pre Procedure History & Physical    Chief Complaint:   Screening    Subjective     HPI:   Screening    Past Medical History:   Past Medical History:   Diagnosis Date    Asthma     Elevated cholesterol     GERD (gastroesophageal reflux disease)     Hypertension     Pre-diabetes        Past Surgical History:  Past Surgical History:   Procedure Laterality Date    COLONOSCOPY      EYE SURGERY      JOINT REPLACEMENT Left     knee    KNEE ARTHROSCOPY W/ LATERAL RELEASE / MEDIAL IMBRICATION      UMBILICAL HERNIA REPAIR         Family History:  Family History   Problem Relation Age of Onset    Heart disease Mother     Asthma Mother     Alzheimer's disease Father     Colon cancer Neg Hx     Colon polyps Neg Hx     Esophageal cancer Neg Hx        Social History:   reports that he has quit smoking. His smoking use included cigarettes. He started smoking about 55 years ago. He has a 55.4 pack-year smoking history. He has never been exposed to tobacco smoke. He has never used smokeless tobacco. He reports current alcohol use. He reports that he does not use drugs.    Medications:   Prior to Admission medications    Medication Sig Start Date End Date Taking? Authorizing Provider   albuterol sulfate  (90 Base) MCG/ACT inhaler    Yes ProviderGaro MD   fluticasone (FLONASE) 50 MCG/ACT nasal spray Administer 2 sprays into the nostril(s) as directed by provider Daily As Needed for Rhinitis. 2/3/25  Yes Sam Norwood,    glipizide (GLUCOTROL) 5 MG tablet Take 1 tablet twice a day by oral route, for diabetes mellitus.. 9/26/24  Yes ProviderGaro MD   lisinopril (PRINIVIL,ZESTRIL) 20 MG tablet Take 1 tablet by mouth Daily. 9/26/24  Yes ProviderGaro MD   simvastatin (ZOCOR) 40 MG tablet Take 1 tablet by mouth Daily. 9/26/24  Yes ProviderGaro MD Wixela Inhub 250-50 MCG/ACT DISKUS  8/25/24  Yes ProviderGaro MD   hydrALAZINE (APRESOLINE) 50 MG  "tablet Take 1 tablet by mouth 2 (Two) Times a Day.  5/28/25 Yes Garo Stone MD   allopurinol (ZYLOPRIM) 300 MG tablet Take 1 tablet by mouth Daily. 9/26/24   Garo Stone MD   cetirizine (zyrTEC) 10 MG tablet Take 1 tablet by mouth Daily. 5/19/25   Sam Norwood,    HYDROcodone Bit-Homatrop MBr (HYCODAN) 5-1.5 MG/5ML solution     Garo Stone MD   meclizine (ANTIVERT) 12.5 MG tablet Take 2 tablets by mouth 3 (Three) Times a Day.    Garo Stone MD   omeprazole (priLOSEC) 40 MG capsule Take 1 capsule by mouth Daily. 9/26/24   Garo Stone MD   ondansetron (ZOFRAN) 4 MG tablet     Garo Stone MD   lidocaine (LIDODERM) 5 %   5/28/25  Garo Stone MD   metFORMIN (GLUCOPHAGE) 500 MG tablet Take 1 tablet by mouth 2 (Two) Times a Day.  5/28/25  Garo Stone MD   metoprolol succinate XL (TOPROL-XL) 50 MG 24 hr tablet   5/28/25  Garo Stone MD   oxyCODONE-acetaminophen (PERCOCET) 7.5-325 MG per tablet   5/28/25  Garo Stone MD   PEG-KCl-NaCl-NaSulf-Na Asc-C (PLENVU) 140 g reconstituted solution solution Take 140 g by mouth Take As Directed. 4/4/25 5/28/25  Oj Monge APRN   predniSONE (DELTASONE) 20 MG tablet TAKE 1 TABLET BY MOUTH EVERY DAY FOR 7 DAYS AS DIRECTED  5/28/25  Garo Stone MD   promethazine-dextromethorphan (PROMETHAZINE-DM) 6.25-15 MG/5ML syrup Take 5 mL by mouth 4 (Four) Times a Day As Needed for Cough. 2/21/25 5/28/25  Macarena Main APRN       Allergies:  Patient has no known allergies.    ROS:    General: Weight stable  Resp: No SOA  Cardiovascular: No CP    Objective     Blood pressure 177/99, pulse 72, temperature 97.3 °F (36.3 °C), temperature source Temporal, resp. rate 16, height 167.6 cm (66\"), weight 80.3 kg (177 lb), SpO2 96%.    Physical Exam   Constitutional: Pt is oriented to person, place, and in no distress.   HENT: Mouth/Throat: Oropharynx is clear. "   Cardiovascular: Normal rate, regular rhythm.    Pulmonary/Chest: Effort normal. No respiratory distress. No  wheezes.   Abdominal: Soft. Non-distended.  Skin: Skin is warm and dry.   Psychiatric: Mood, memory, affect and judgment appear normal.     Assessment & Plan     Diagnosis:  Screening    Anticipated Surgical Procedure:  C-scope    The risks, benefits, and alternatives of this procedure have been discussed with the patient or the responsible party- the patient understands and agrees to proceed.

## 2025-05-28 NOTE — ANESTHESIA POSTPROCEDURE EVALUATION
Patient: Kem Joseph    Procedure Summary       Date: 05/28/25 Room / Location: [facility] ENDOSCOPY 5 / [facility]    Anesthesia Start: 1221 Anesthesia Stop: 1235    Procedure: COLONOSCOPY WITH ANESTHESIA- (examination of colon) Diagnosis:       History of colon polyps      (History of colon polyps [Z86.0100])    Surgeons: Franklin Dominguez DO Provider: Eliza Crawford CRNA    Anesthesia Type: MAC ASA Status: 2            Anesthesia Type: MAC    Vitals  Vitals Value Taken Time   BP     Temp     Pulse 78 05/28/25 12:37   Resp     SpO2     Vitals shown include unfiled device data.        Post Anesthesia Care and Evaluation    Patient location during evaluation: PHASE II  Patient participation: complete - patient participated  Level of consciousness: awake and alert  Pain management: adequate    Airway patency: patent  Anesthetic complications: No anesthetic complications  PONV Status: none  Cardiovascular status: acceptable  Respiratory status: acceptable  Hydration status: acceptable  No anesthesia care post op

## 2025-05-28 NOTE — ANESTHESIA PREPROCEDURE EVALUATION
Anesthesia Evaluation     no history of anesthetic complications:   NPO Solid Status: > 8 hours  NPO Liquid Status: > 2 hours           Airway   Mallampati: III  TM distance: <3 FB  Neck ROM: full  Possible difficult intubation  Dental      Pulmonary    (+) a smoker Former, asthma,  (-) sleep apnea  Cardiovascular   Exercise tolerance: good (4-7 METS)    (+) hypertension  (-) CAD      Neuro/Psych  (-) seizures, TIA, CVA  GI/Hepatic/Renal/Endo    (+) diabetes mellitus (borderline)  (-) liver disease, no renal disease    Musculoskeletal     Abdominal    Substance History      OB/GYN          Other                    Anesthesia Plan    ASA 2     MAC     intravenous induction     Anesthetic plan, risks, benefits, and alternatives have been provided, discussed and informed consent has been obtained with: patient.    CODE STATUS:

## 2025-07-21 RX ORDER — FLUTICASONE PROPIONATE AND SALMETEROL 250; 50 UG/1; UG/1
1 POWDER RESPIRATORY (INHALATION)
Qty: 1 EACH | Refills: 11 | Status: SHIPPED | OUTPATIENT
Start: 2025-07-21

## 2025-07-21 NOTE — TELEPHONE ENCOUNTER
Patient came into office stating that the medication that was sent to [facility] would be $150 for copay, due to [facility] . Patient would just have to wait 10 days. Patient was given a sample of Trelegy to have until prescriptions come in mail in 10 days. Patient was advised to call and let us know if any issues taken medication.

## 2025-07-21 NOTE — TELEPHONE ENCOUNTER
Did not pass protocol     Rx Refill Note  Requested Prescriptions     Pending Prescriptions Disp Refills    Wixela Inhub 250-50 MCG/ACT DISKUS        Last office visit with prescribing clinician: 2/3/2025   Last telemedicine visit with prescribing clinician: Visit date not found   Next office visit with prescribing clinician: 2/3/2026                         Would you like a call back once the refill request has been completed: [] Yes [] No    If the office needs to give you a call back, can they leave a voicemail: [] Yes [] No    Jessica Barcenas MA  07/21/25, 11:06 CDT

## 2025-08-11 ENCOUNTER — OFFICE VISIT (OUTPATIENT)
Dept: PULMONOLOGY | Facility: CLINIC | Age: 75
End: 2025-08-11
Payer: MEDICARE

## 2025-08-11 ENCOUNTER — HOSPITAL ENCOUNTER (OUTPATIENT)
Dept: GENERAL RADIOLOGY | Facility: HOSPITAL | Age: 75
Discharge: HOME | End: 2025-08-11
Admitting: INTERNAL MEDICINE
Payer: MEDICARE

## 2025-08-11 VITALS
RESPIRATION RATE: 18 BRPM | SYSTOLIC BLOOD PRESSURE: 120 MMHG | HEIGHT: 66 IN | OXYGEN SATURATION: 94 % | DIASTOLIC BLOOD PRESSURE: 72 MMHG | BODY MASS INDEX: 28.77 KG/M2 | HEART RATE: 86 BPM | WEIGHT: 179 LBS

## 2025-08-11 DIAGNOSIS — Z87.891 FORMER SMOKER: ICD-10-CM

## 2025-08-11 DIAGNOSIS — R06.09 CHRONIC DYSPNEA: ICD-10-CM

## 2025-08-11 DIAGNOSIS — J45.30 MILD PERSISTENT ASTHMA, UNSPECIFIED WHETHER COMPLICATED: ICD-10-CM

## 2025-08-11 DIAGNOSIS — J45.30 MILD PERSISTENT ASTHMA, UNSPECIFIED WHETHER COMPLICATED: Primary | ICD-10-CM

## 2025-08-11 PROCEDURE — 99214 OFFICE O/P EST MOD 30 MIN: CPT | Performed by: INTERNAL MEDICINE

## 2025-08-11 PROCEDURE — 71046 X-RAY EXAM CHEST 2 VIEWS: CPT

## 2025-08-11 RX ORDER — DEXTROMETHORPHAN HYDROBROMIDE AND PROMETHAZINE HYDROCHLORIDE 15; 6.25 MG/5ML; MG/5ML
SYRUP ORAL
COMMUNITY

## (undated) DEVICE — DEFENDO AIR WATER SUCTION AND BIOPSY VALVE KIT FOR  OLYMPUS: Brand: DEFENDO AIR/WATER/SUCTION AND BIOPSY VALVE

## (undated) DEVICE — MASK,OXYGEN,MED CONC,ADLT,7' TUB, UC: Brand: PENDING

## (undated) DEVICE — ARGYLE YANKAUER BULB TIP WITH VENT: Brand: ARGYLE

## (undated) DEVICE — SENSR O2 OXIMAX FNGR A/ 18IN NONSTR

## (undated) DEVICE — THE CHANNEL CLEANING BRUSH IS A NYLON FLEXI BRUSH ATTACHED TO A FLEXIBLE PLASTIC SHEATH DESIGNED TO SAFELY REMOVE DEBRIS FROM FLEXIBLE ENDOSCOPES.